# Patient Record
Sex: FEMALE | Race: WHITE | Employment: OTHER | URBAN - METROPOLITAN AREA
[De-identification: names, ages, dates, MRNs, and addresses within clinical notes are randomized per-mention and may not be internally consistent; named-entity substitution may affect disease eponyms.]

---

## 2017-02-28 ENCOUNTER — HOSPITAL ENCOUNTER (OUTPATIENT)
Dept: LAB | Age: 72
Discharge: HOME | End: 2017-02-28
Payer: MEDICARE

## 2017-02-28 ENCOUNTER — OFFICE VISIT (OUTPATIENT)
Dept: FAMILY MEDICINE CLINIC | Age: 72
End: 2017-02-28

## 2017-02-28 VITALS
SYSTOLIC BLOOD PRESSURE: 134 MMHG | DIASTOLIC BLOOD PRESSURE: 74 MMHG | WEIGHT: 123 LBS | HEIGHT: 62 IN | TEMPERATURE: 98.1 F | RESPIRATION RATE: 18 BRPM | BODY MASS INDEX: 22.63 KG/M2 | OXYGEN SATURATION: 94 % | HEART RATE: 80 BPM

## 2017-02-28 DIAGNOSIS — R30.0 DYSURIA: Primary | ICD-10-CM

## 2017-02-28 DIAGNOSIS — N39.0 URINARY TRACT INFECTION WITHOUT HEMATURIA, SITE UNSPECIFIED: ICD-10-CM

## 2017-02-28 LAB
BILIRUB UR QL STRIP: NEGATIVE
GLUCOSE UR-MCNC: NEGATIVE MG/DL
KETONES P FAST UR STRIP-MCNC: NORMAL MG/DL
PH UR STRIP: 6 [PH] (ref 4.6–8)
PROT UR QL STRIP: NEGATIVE MG/DL
SP GR UR STRIP: 1.01 (ref 1–1.03)
UA UROBILINOGEN AMB POC: NORMAL (ref 0.2–1)
URINALYSIS CLARITY POC: CLEAR
URINALYSIS COLOR POC: YELLOW
URINE BLOOD POC: NEGATIVE
URINE LEUKOCYTES POC: NORMAL
URINE NITRITES POC: NEGATIVE

## 2017-02-28 PROCEDURE — 80048 BASIC METABOLIC PNL TOTAL CA: CPT

## 2017-02-28 PROCEDURE — 87086 URINE CULTURE/COLONY COUNT: CPT

## 2017-02-28 PROCEDURE — 36415 COLL VENOUS BLD VENIPUNCTURE: CPT

## 2017-02-28 RX ORDER — NITROFURANTOIN 25; 75 MG/1; MG/1
100 CAPSULE ORAL 2 TIMES DAILY
Qty: 14 CAP | Refills: 0 | Status: SHIPPED | OUTPATIENT
Start: 2017-02-28 | End: 2017-05-16 | Stop reason: ALTCHOICE

## 2017-02-28 NOTE — PROGRESS NOTES
Chief Complaint   Patient presents with    Bladder Infection     X 7 days, frequency, urgency     1. Have you been to the ER, urgent care clinic since your last visit? Hospitalized since your last visit? No    2. Have you seen or consulted any other health care providers outside of the [address] [place] [address] since your last visit? Include any pap smears or colon screening.  No

## 2017-02-28 NOTE — MR AVS SNAPSHOT
Visit Information Date & Time Provider Department Dept. Phone Encounter #  
 2/28/2017  9:00 AM Vaughn Herrera, [address] [facility] [phone] [id number] Upcoming Health Maintenance Date Due Hepatitis C Screening 1945 FOBT Q 1 YEAR AGE 50-75 1/9/1995 GLAUCOMA SCREENING Q2Y 1/9/2010 Pneumococcal 65+ High/Highest Risk (1 of 2 - PCV13) 1/9/2010 INFLUENZA AGE 9 TO ADULT 8/1/2016 MEDICARE YEARLY EXAM 5/4/2017 BREAST CANCER SCRN MAMMOGRAM 5/16/2018 DTaP/Tdap/Td series (2 - Td) 1/27/2021 Allergies as of 2/28/2017  Review Complete On: 2/28/2017 By: Vaughn Herrera MD  
  
 Severity Noted Reaction Type Reactions Sulfa (Sulfonamide Antibiotics)  09/09/2010    Not Reported This Time Current Immunizations  Reviewed on 4/29/2014 Name Date Poliovirus vaccine 1/27/2011 Tdap 1/27/2011 Typhoid Vi Capsular Polysaccharide Vaccine 1/27/2011 Not reviewed this visit You Were Diagnosed With   
  
 Codes Comments Dysuria    -  Primary ICD-10-CM: R30.0 ICD-9-CM: 788.1 Urinary tract infection without hematuria, site unspecified     ICD-10-CM: N39.0 ICD-9-CM: 599.0 Vitals BP  
  
  
  
  
  
 134/74 (BP 1 Location: Right arm, BP Patient Position: Sitting) BMI and BSA Data Body Mass Index Body Surface Area  
 22.86 kg/m 2 1.56 m 2 Preferred Pharmacy Pharmacy Name Phone [address] [phone] [facility] [phone] Your Updated Medication List  
  
   
This list is accurate as of: 2/28/17  4:37 PM.  Always use your most recent med list.  
  
  
  
  
 albuterol 90 mcg/actuation inhaler Commonly known as:  PROVENTIL HFA, VENTOLIN HFA, PROAIR HFA Take 1 Puff by inhalation every six (6) hours as needed for Wheezing. amLODIPine 10 mg tablet Commonly known as:  [place] Mend Take 1 Tab by mouth daily. ANTACID CALCIUM PO Take  by mouth. aspirin 81 mg chewable tablet Take 325 mg by mouth daily. levothyroxine 100 mcg tablet Commonly known as:  SYNTHROID  
TAKE 1 TAB BY MOUTH DAILY (BEFORE BREAKFAST). loratadine 10 mg tablet Commonly known as:  [place] Anjali Take 1 Tab by mouth daily. M-VIT PO Take  by mouth.  
  
 mometasone 50 mcg/actuation nasal spray Commonly known as:  NASONEX  
2 Sprays by Both Nostrils route daily. nitrofurantoin (macrocrystal-monohydrate) 100 mg capsule Commonly known as:  MACROBID Take 1 Cap by mouth two (2) times a day. VITAMIN D3 2,000 unit Tab Generic drug:  cholecalciferol (vitamin D3) Take 1,000 Int'l Units by mouth. Prescriptions Sent to Pharmacy Refills  
 nitrofurantoin, macrocrystal-monohydrate, (MACROBID) 100 mg capsule 0 Sig: Take 1 Cap by mouth two (2) times a day. Class: Normal  
 Pharmacy: [address] [place] [address] Ph #: [phone] Route: Oral  
  
We Performed the Following AMB POC URINALYSIS DIP STICK AUTO W/O MICRO [31674 CPT(R)] CULTURE, URINE [id number] CPT(R)] METABOLIC PANEL, BASIC [30306 CPT(R)] Introducing [facility] SERVICES! Dear Maykel Greene: Thank you for requesting a Intellecap account. Our records indicate that you already have an active Intelle[employer] account. You can access your account anytime at [email or web address]. [email or web address]/[employer] Services Did you know that you can access your hospital and ER discharge instructions at any time in Intellecap? You can also review all of your test results from your hospital stay or ER visit. Additional Information If you have questions, please visit the Frequently Asked Questions section of the Intellecap website at [email or web address] Remember, Intellecap is NOT to be used for urgent needs. For medical emergencies, dial 911. Now available from your iPhone and Android! Please provide this summary of care documentation to your next provider. Your primary care clinician is listed as [facility], . If you have any questions after today's visit, please call [phone].

## 2017-02-28 NOTE — PROGRESS NOTES
Presents complaining of dysuria    States that she has had sxs for about one week    Drinking lots of cranberry juice yesterday    sxs for one week    Temperature has been low    + chills    No vomiting or diarrhea    Complaining of heavines in lower abdomen    Even though she feels like she has to go, she has only a dribble    Denies any back pain or burning when she urinates    Denies vaginal discharge or bleeding    Denies any flank pain    PE:  General -- awake, alert, cooperative  Back -- no tenderness with percussion  Abd -- no tenderness on palpation    U/A:  + leukocytes    Assessment:  UTI    Plan:  rx macrobid 100 mg BID  Urine culture  Check BMP for creatinine clearance

## 2017-03-01 LAB
BACTERIA UR CULT: NO GROWTH
BUN SERPL-MCNC: 10 MG/DL (ref 8–27)
BUN/CREAT SERPL: 19 (ref 11–26)
CALCIUM SERPL-MCNC: 9.5 MG/DL (ref 8.7–10.3)
CHLORIDE SERPL-SCNC: 91 MMOL/L (ref 96–106)
CO2 SERPL-SCNC: 27 MMOL/L (ref 18–29)
CREAT SERPL-MCNC: 0.54 MG/DL (ref 0.57–1)
GLUCOSE SERPL-MCNC: 88 MG/DL (ref 65–99)
POTASSIUM SERPL-SCNC: 4.9 MMOL/L (ref 3.5–5.2)
SODIUM SERPL-SCNC: 135 MMOL/L (ref 134–144)

## 2017-05-16 ENCOUNTER — HOSPITAL ENCOUNTER (OUTPATIENT)
Dept: LAB | Age: 72
Discharge: HOME | End: 2017-05-16
Payer: MEDICARE

## 2017-05-16 ENCOUNTER — OFFICE VISIT (OUTPATIENT)
Dept: FAMILY MEDICINE CLINIC | Age: 72
End: 2017-05-16

## 2017-05-16 VITALS
OXYGEN SATURATION: 94 % | HEIGHT: 62 IN | DIASTOLIC BLOOD PRESSURE: 75 MMHG | SYSTOLIC BLOOD PRESSURE: 122 MMHG | BODY MASS INDEX: 22.08 KG/M2 | RESPIRATION RATE: 16 BRPM | HEART RATE: 77 BPM | WEIGHT: 120 LBS | TEMPERATURE: 98.1 F

## 2017-05-16 DIAGNOSIS — L98.9 SKIN LESION OF FACE: ICD-10-CM

## 2017-05-16 DIAGNOSIS — Z12.31 ENCOUNTER FOR SCREENING MAMMOGRAM FOR BREAST CANCER: ICD-10-CM

## 2017-05-16 DIAGNOSIS — R63.4 UNINTENTIONAL WEIGHT LOSS: ICD-10-CM

## 2017-05-16 DIAGNOSIS — M81.0 OSTEOPOROSIS, UNSPECIFIED OSTEOPOROSIS TYPE, UNSPECIFIED PATHOLOGICAL FRACTURE PRESENCE: ICD-10-CM

## 2017-05-16 DIAGNOSIS — I10 ESSENTIAL HYPERTENSION: ICD-10-CM

## 2017-05-16 DIAGNOSIS — R05.3 CHRONIC COUGH: ICD-10-CM

## 2017-05-16 DIAGNOSIS — Z00.00 MEDICARE ANNUAL WELLNESS VISIT, SUBSEQUENT: Primary | ICD-10-CM

## 2017-05-16 DIAGNOSIS — J45.909 UNCOMPLICATED ASTHMA, UNSPECIFIED ASTHMA SEVERITY: ICD-10-CM

## 2017-05-16 DIAGNOSIS — E03.9 HYPOTHYROIDISM, UNSPECIFIED TYPE: ICD-10-CM

## 2017-05-16 PROCEDURE — 80061 LIPID PANEL: CPT

## 2017-05-16 PROCEDURE — 84443 ASSAY THYROID STIM HORMONE: CPT

## 2017-05-16 PROCEDURE — 85027 COMPLETE CBC AUTOMATED: CPT

## 2017-05-16 PROCEDURE — 36415 COLL VENOUS BLD VENIPUNCTURE: CPT

## 2017-05-16 PROCEDURE — 80053 COMPREHEN METABOLIC PANEL: CPT

## 2017-05-16 RX ORDER — LEVOCETIRIZINE DIHYDROCHLORIDE 5 MG/1
TABLET, FILM COATED ORAL
COMMUNITY

## 2017-05-16 RX ORDER — LEVOTHYROXINE SODIUM 100 UG/1
TABLET ORAL
Qty: 90 TAB | Refills: 3 | Status: SHIPPED | OUTPATIENT
Start: 2017-05-16 | End: 2018-04-30 | Stop reason: SDUPTHER

## 2017-05-16 RX ORDER — CHOLECALCIFEROL (VITAMIN D3) 125 MCG
CAPSULE ORAL
COMMUNITY

## 2017-05-16 RX ORDER — AMLODIPINE BESYLATE 10 MG/1
10 TABLET ORAL DAILY
Qty: 90 TAB | Refills: 3 | Status: SHIPPED | OUTPATIENT
Start: 2017-05-16 | End: 2018-05-21 | Stop reason: SDUPTHER

## 2017-05-16 RX ORDER — ALBUTEROL SULFATE 90 UG/1
1 AEROSOL, METERED RESPIRATORY (INHALATION)
Qty: 3 INHALER | Refills: 3 | Status: SHIPPED | OUTPATIENT
Start: 2017-05-16 | End: 2019-01-30 | Stop reason: SDUPTHER

## 2017-05-16 NOTE — PROGRESS NOTES
Date of visit: 5/16/2017       This is an Subsequent Medicare Annual Wellness Visit (AWV). I have reviewed the patient's medical history in detail and updated the computerized patient record. Shila Faulkner is a 67 y.o. female   History obtained from: the patient. Concerns today   Was on zyrtec -- now taking xytal     States nasonex not working  States will try flonase if she needs nasal steroid    States that some days her back aches -- paraspinal more in the thoracic region from coughing    + cough -- light sputum, white at times  Feels that it is due to drainage down her throat  Remote hx of smoking -- also states unintentional weight loss  Has not had any recent imaging  Hx asthma -- asking if she can get albuterol solution and a nebulizer    States has been through immunotherapy in the past and not interested in doing that now    Also has scaly lesions that she is concerned about on her face -- will refer to dermatology    History     Patient Active Problem List   Diagnosis Code    HTN (hypertension) I10    Hypercholesterolemia E78.00    Hypothyroid E03.9    Asthma J45.909    Osteoporosis M81.0    Allergic rhinitis due to allergen J30.9     Past Medical History:   Diagnosis Date    Asthma     Hypertension       Past Surgical History:   Procedure Laterality Date    HX DILATION AND CURETTAGE       Allergies   Allergen Reactions    Sulfa (Sulfonamide Antibiotics) Not Reported This Time     Current Outpatient Prescriptions   Medication Sig Dispense Refill    levocetirizine (XYZAL) 5 mg tablet Take  by mouth.  melatonin tab tablet Take  by mouth nightly.  albuterol (PROVENTIL HFA, VENTOLIN HFA, PROAIR HFA) 90 mcg/actuation inhaler Take 1 Puff by inhalation every six (6) hours as needed for Wheezing. 3 Inhaler 3    amLODIPine (NORVASC) 10 mg tablet Take 1 Tab by mouth daily. 90 Tab 3    levothyroxine (SYNTHROID) 100 mcg tablet TAKE 1 TAB BY MOUTH DAILY (BEFORE BREAKFAST). 90 Tab 3    CALCIUM CARBONATE (ANTACID CALCIUM PO) Take  by mouth.  cholecalciferol, vitamin D3, (VITAMIN D3) 2,000 unit Tab Take 1,000 Int'l Units by mouth.  aspirin 81 mg chewable tablet Take 325 mg by mouth daily.  PV W-O BONY/FERROUS FUMARATE/FA (M-VIT PO) Take  by mouth.  mometasone (NASONEX) 50 mcg/actuation nasal spray 2 Sprays by Both Nostrils route daily. 3 Container 3    loratadine (CLARITIN) 10 mg tablet Take 1 Tab by mouth daily. 61 Tab 1     Family History   Problem Relation Age of Onset    Heart Disease Mother      Social History   Substance Use Topics    Smoking status: Former Smoker     Quit date: 1/1/1999    Smokeless tobacco: Never Used    Alcohol use No       Specialists/Care Team   Jose Dunaway has established care with the following healthcare providers:  Patient Care Team:  Henry Rubin MD as PCP - [address]     Demographics   female  67 y.o. General Health Questions   -During the past 4 weeks:   -how would you rate your health in general? Fair   -how often have you been bothered by feeling dizzy when standing up? occasionally   -how much have you been bothered by bodily pain? mildly   -Have you noticed any hearing difficulties? no   -has your physical and emotional health limited your social activities with family or friends? yes due to coughing    Emotional Health Questions   -Do you have a history of depression, anxiety, or emotional problems? no  -Over the past 2 weeks, have you felt down, depressed or hopeless? no  -Over the past 2 weeks, have you felt little interest or pleasure in doing things? no    Health Habits   Please describe your diet habits: healthy; fruit and vegetable, whole grains, protein, not much dairy  Do you get 5 servings of fruits or vegetables daily? no  Do you exercise regularly?  No, light yard work    Activities of Daily Living and Functional Status   -Do you need help with eating, walking, dressing, bathing, toileting, the phone, transportation, shopping, preparing meals, housework, laundry, medications or managing money? no  -In the past four weeks, was someone available to help you if you needed and wanted help with anything? yes  -Are you confident are you that you can control and manage most of your health problems? yes  -Have you been given information to help you keep track of your medications? yes  -How often do you have trouble taking your medications as prescribed? occasionally    Fall Risk and Home Safety   Have you fallen 2 or more times in the past year? no  Does your home have rugs in the hallway, lack grab bars in the bathroom, lack handrails on the stairs or have poor lighting? no  Do you have smoke detectors and check them regularly? yes  Do you have difficulties driving a car? no  Do you always fasten your seat belt when you are in a car? yes    Review of Systems (if indicated for problems addressed today)   + cough  Weight loss    Physical Examination     Vitals:    05/16/17 0901   BP: 122/75   Pulse: 77   Resp: 16   Temp: 98.1 °F (36.7 °C)   TempSrc: Oral   SpO2: 94%   Weight: 120 lb (54.4 kg)   Height: 5' 1.5\" (1.562 m)     Body mass index is 22.31 kg/(m^2).    General -- awake, alert  Lungs -- clear bilaterally  CV -- regular, S1S2  abd -- soft, NT, ND  Ext -- no edema, no tenderness    Was the patient's timed Up & Go test unsteady or longer than 30 seconds? no    Evaluation of Cognitive Function   Mood/affect:  neutral  Orientation: Person, Place, Time and Situation  Appearance: casually dressed    Advice/Referrals/Counseling (as indicated)   Education and counseling provided for any problems identified above:   Referral for colonoscopy; also given stool DNA card  Osteoporosis hx -- needs med but wants to wait at this time   Chronic cough -- will obtain CT scan chest    Preventive Services     (Preventive care checklist to be included in patient instructions)  Discussed today Done Previously Not Needed     X states has had both  Pneumococcal vaccines    x  Flu vaccine    2009  Hepatitis B vaccine (if at risk)    x  Shingles vaccine    2011  TDAP vaccine    2016  Mammogram     x Pap smear    X due now for repeat  Colorectal cancer screening   X discussed today and ordered   Low-dose CT for lung cancer screening    X  Bone density test      Glaucoma screening      Cholesterol test      Diabetes screening test       Diabetes self-management class      Nutritionist referral for diabetes or renal disease     Discussion of Advance Directive   Discussed with Britt Smith. [place] her ability to prepare and advance directive in the case that an injury or illness causes her to be unable to make health care decisions.    Has living will    Assessment/Plan   Z00.00  Hypothyroidism  HTN  Osteoporosis  Chronic cough  Hx tobacco use  Allergic rhinitis      Orders Placed This Encounter    levocetirizine (XYZAL) 5 mg tablet    melatonin tab tablet           Akilah Hooper MD

## 2017-05-16 NOTE — MR AVS SNAPSHOT
Visit Information Date & Time Provider Department Dept. Phone Encounter #  
 5/16/2017  9:20 AM Perla Mail, [address] [facility] [phone] [id number] Upcoming Health Maintenance Date Due Hepatitis C Screening 1945 FOBT Q 1 YEAR AGE 50-75 1/9/1995 GLAUCOMA SCREENING Q2Y 1/9/2010 Pneumococcal 65+ High/Highest Risk (1 of 2 - PCV13) 1/9/2010 MEDICARE YEARLY EXAM 5/4/2017 INFLUENZA AGE 9 TO ADULT 8/1/2017 BREAST CANCER SCRN MAMMOGRAM 5/16/2018 DTaP/Tdap/Td series (2 - Td) 1/27/2021 Allergies as of 5/16/2017  Review Complete On: 5/16/2017 By: Deepti Barrera LPN Severity Noted Reaction Type Reactions Sulfa (Sulfonamide Antibiotics)  09/09/2010    Not Reported This Time Current Immunizations  Reviewed on 4/29/2014 Name Date Poliovirus vaccine 1/27/2011 Tdap 1/27/2011 Typhoid Vi Capsular Polysaccharide Vaccine 1/27/2011 Not reviewed this visit You Were Diagnosed With   
  
 Codes Comments Medicare annual wellness visit, subsequent    -  Primary ICD-10-CM: Z00.00 ICD-9-CM: V70.0 Chronic cough     ICD-10-CM: R05 ICD-9-CM: 786.2 Osteoporosis, unspecified osteoporosis type, unspecified pathological fracture presence     ICD-10-CM: M81.0 ICD-9-CM: 733.00 Uncomplicated asthma, unspecified asthma severity     ICD-10-CM: J45.909 ICD-9-CM: 493.90 Essential hypertension     ICD-10-CM: I10 
ICD-9-CM: 401.9 Skin lesion of face     ICD-10-CM: L98.9 ICD-9-CM: 709.9 Unintentional weight loss     ICD-10-CM: R63.4 ICD-9-CM: 783.21 Hypothyroidism, unspecified type     ICD-10-CM: E03.9 ICD-9-CM: 244.9 Encounter for screening mammogram for breast cancer     ICD-10-CM: Z12.31 
ICD-9-CM: V76.12 Vitals BP Pulse Temp Resp Height(growth percentile) Weight(growth percentile)  122/75 (BP 1 Location: Left arm, BP Patient Position: Sitting) 77 98.1 °F (36.7 °C) (Oral) 16 5' 1.5\" (1.562 m) 120 lb (54.4 kg) LMP SpO2 BMI OB Status Smoking Status 06/20/2000 94% 22.31 kg/m2 Postmenopausal Former Smoker Vitals History BMI and BSA Data Body Mass Index Body Surface Area  
 22.31 kg/m 2 1.54 m 2 Preferred Pharmacy Pharmacy Name Phone [address] [id number] [place], Susan. \Hospital [facility] a New Name and Outlook.\"" [phone] Your Updated Medication List  
  
   
This list is accurate as of: 5/16/17 10:19 AM.  Always use your most recent med list.  
  
  
  
  
 albuterol 90 mcg/actuation inhaler Commonly known as:  PROVENTIL HFA, VENTOLIN HFA, PROAIR HFA Take 1 Puff by inhalation every six (6) hours as needed for Wheezing. amLODIPine 10 mg tablet Commonly known as:  [place] Dayron Take 1 Tab by mouth daily. ANTACID CALCIUM PO Take  by mouth. aspirin 81 mg chewable tablet Take 325 mg by mouth daily. levothyroxine 100 mcg tablet Commonly known as:  SYNTHROID  
TAKE 1 TAB BY MOUTH DAILY (BEFORE BREAKFAST). loratadine 10 mg tablet Commonly known as:  [place] Cowboy Take 1 Tab by mouth daily. M-VIT PO Take  by mouth.  
  
 melatonin Tab tablet Take  by mouth nightly. mometasone 50 mcg/actuation nasal spray Commonly known as:  NASONEX  
2 Sprays by Both Nostrils route daily. VITAMIN D3 2,000 unit Tab Generic drug:  cholecalciferol (vitamin D3) Take 1,000 Int'l Units by mouth. XYZAL 5 mg tablet Generic drug:  levocetirizine Take  by mouth. Prescriptions Sent to Pharmacy Refills  
 albuterol (PROVENTIL HFA, VENTOLIN HFA, PROAIR HFA) 90 mcg/actuation inhaler 3 Sig: Take 1 Puff by inhalation every six (6) hours as needed for Wheezing. Class: Normal  
 Pharmacy: [id number] - [address] [place] [address] Ph #: [phone]  Route: Inhalation  
 levothyroxine (SYNTHROID) 100 mcg tablet 3  
 Sig: TAKE 1 TAB BY MOUTH DAILY (BEFORE BREAKFAST). Class: Normal  
 Pharmacy: [facility] [address], [address] Ph #: [phone]  
 amLODIPine (NORVASC) 10 mg tablet 3 Sig: Take 1 Tab by mouth daily. Class: Normal  
 Pharmacy: [id number]  [address] [place], [place] - [address] [facility] Ph #: [phone] Route: Oral  
  
We Performed the Following CBC W/O DIFF [12072 CPT(R)] LIPID PANEL [09464 CPT(R)] METABOLIC PANEL, COMPREHENSIVE [92314 CPT(R)] REFERRAL TO DERMATOLOGY [REF19 Custom] Comments:  
 Please evaluate patient for scaly, facial skin lesion TSH 3RD GENERATION [98280 CPT(R)] To-Do List   
 05/16/2017 Imaging:  CT CHEST W WO CONT   
  
 05/16/2017 Imaging:  HARPAL 3D HARMEET W MAMMO BI SCREENING INCL CAD Referral Information Referral ID Referred By Referred To  
  
 [id number] Mariel Yu MD   
   [address] [place] [address] Phone: [phone] Fax: [phone] Visits Status Start Date End Date 1 New Request 5/16/17 5/16/18 If your referral has a status of pending review or denied, additional information will be sent to support the outcome of this decision. Introducing [facility] SERVICES! Dear Vandana Harmon: Thank you for requesting a EV[employer] account. Our records indicate that you already have an active EV[employer] account. You can access your account anytime at [email or web address] Did you know that you can access your hospital and ER discharge instructions at any time in EV[employer]? You can also review all of your test results from your hospital stay or ER visit. Additional Information If you have questions, please visit the Frequently Asked Questions section of the [employer] website at [email or web address] Remember, [employer] is NOT to be used for urgent needs. For medical emergencies, dial 911. Now available from your iPhone and Android! Please provide this summary of care documentation to your next provider. Your primary care clinician is listed as [address] [place] [address], . If you have any questions after today's visit, please call [phone].

## 2017-05-16 NOTE — PROGRESS NOTES
Chief Complaint   Patient presents with   [place] Annual Wellness Visit     1. Have you been to the ER, urgent care clinic since your last visit? Hospitalized since your last visit? No    2. Have you seen or consulted any other health care providers outside of the [address] [place] [address] since your last visit? Include any pap smears or colon screening.  No

## 2017-05-16 NOTE — LETTER
5/19/2017 1:45 PM 
 
Ms. Tonio Mejia [address] [place] [address] Dear Tonio Mejia: 
 
Please find your most recent results below. Resulted Orders LIPID PANEL Result Value Ref Range Cholesterol, total 264 (H) 100 - 199 mg/dL Triglyceride 121 0 - 149 mg/dL HDL Cholesterol 89 >39 mg/dL VLDL, calculated 24 5 - 40 mg/dL LDL, calculated 151 (H) 0 - 99 mg/dL Narrative Performed at:  [address]  [id number] : Billy Martins MD, Phone:  [phone] METABOLIC PANEL, COMPREHENSIVE Result Value Ref Range Glucose 86 65 - 99 mg/dL BUN 7 (L) 8 - 27 mg/dL Creatinine 0.61 0.57 - 1.00 mg/dL GFR est non-AA 91 >59 mL/min/1.73 GFR est  >59 mL/min/1.73  
 BUN/Creatinine ratio 11 (L) 12 - 28 Sodium 137 134 - 144 mmol/L Potassium 4.6 3.5 - 5.2 mmol/L Chloride 95 (L) 96 - 106 mmol/L  
 CO2 29 18 - 29 mmol/L Calcium 9.6 8.7 - 10.3 mg/dL Protein, total 8.2 6.0 - 8.5 g/dL Albumin 4.4 3.5 - 4.8 g/dL GLOBULIN, TOTAL 3.8 1.5 - 4.5 g/dL A-G Ratio 1.2 1.2 - 2.2 Bilirubin, total 0.3 0.0 - 1.2 mg/dL Alk. phosphatase 72 39 - 117 IU/L  
 AST (SGOT) 26 0 - 40 IU/L  
 ALT (SGPT) 15 0 - 32 IU/L Narrative Performed at:  [address]  [id number] : Billy Martins MD, Phone:  [phone] CBC W/O DIFF Result Value Ref Range WBC 10.0 3.4 - 10.8 x10E3/uL  
 RBC 4.66 3.77 - 5.28 x10E6/uL HGB 14.1 11.1 - 15.9 g/dL HCT 42.9 34.0 - 46.6 % MCV 92 79 - 97 fL  
 MCH 30.3 26.6 - 33.0 pg  
 MCHC 32.9 31.5 - 35.7 g/dL  
 RDW 13.5 12.3 - 15.4 % PLATELET 166 349 - 775 x10E3/uL Narrative Performed at:  [address]  [id number] : Billy Martins MD, Phone:  [phone] TSH 3RD GENERATION Result Value Ref Range TSH 3.100 0.450 - 4.500 uIU/mL Narrative Performed at:  [address]  [id number] : Stef Cordoba MD, Phone:  [phone] CVD REPORT Result Value Ref Range INTERPRETATION Note Comment:  
   Supplement report is available. Narrative Performed at:  [address] [place] [address]  [id number] : Renetta Pardo PhD, Phone:  [phone] Results    
    
  Cholesterol level higher -- would recommend statin Normal glucose Normal kidney function Normal liver function No anemia Normal thyroid function Please call me if you have any questions: [phone] Sincerely, Durga Orozco MD

## 2017-05-17 PROBLEM — R05.3 CHRONIC COUGH: Status: ACTIVE | Noted: 2017-05-17

## 2017-05-17 PROBLEM — R63.4 UNINTENTIONAL WEIGHT LOSS: Status: ACTIVE | Noted: 2017-05-17

## 2017-05-17 PROBLEM — Z87.891 FORMER SMOKER: Status: ACTIVE | Noted: 2017-05-17

## 2017-05-17 LAB
ALBUMIN SERPL-MCNC: 4.4 G/DL (ref 3.5–4.8)
ALBUMIN/GLOB SERPL: 1.2 {RATIO} (ref 1.2–2.2)
ALP SERPL-CCNC: 72 IU/L (ref 39–117)
ALT SERPL-CCNC: 15 IU/L (ref 0–32)
AST SERPL-CCNC: 26 IU/L (ref 0–40)
BILIRUB SERPL-MCNC: 0.3 MG/DL (ref 0–1.2)
BUN SERPL-MCNC: 7 MG/DL (ref 8–27)
BUN/CREAT SERPL: 11 (ref 12–28)
CALCIUM SERPL-MCNC: 9.6 MG/DL (ref 8.7–10.3)
CHLORIDE SERPL-SCNC: 95 MMOL/L (ref 96–106)
CHOLEST SERPL-MCNC: 264 MG/DL (ref 100–199)
CO2 SERPL-SCNC: 29 MMOL/L (ref 18–29)
CREAT SERPL-MCNC: 0.61 MG/DL (ref 0.57–1)
ERYTHROCYTE [DISTWIDTH] IN BLOOD BY AUTOMATED COUNT: 13.5 % (ref 12.3–15.4)
GLOBULIN SER CALC-MCNC: 3.8 G/DL (ref 1.5–4.5)
GLUCOSE SERPL-MCNC: 86 MG/DL (ref 65–99)
HCT VFR BLD AUTO: 42.9 % (ref 34–46.6)
HDLC SERPL-MCNC: 89 MG/DL
HGB BLD-MCNC: 14.1 G/DL (ref 11.1–15.9)
INTERPRETATION, 910389: NORMAL
LDLC SERPL CALC-MCNC: 151 MG/DL (ref 0–99)
MCH RBC QN AUTO: 30.3 PG (ref 26.6–33)
MCHC RBC AUTO-ENTMCNC: 32.9 G/DL (ref 31.5–35.7)
MCV RBC AUTO: 92 FL (ref 79–97)
PLATELET # BLD AUTO: 376 X10E3/UL (ref 150–379)
POTASSIUM SERPL-SCNC: 4.6 MMOL/L (ref 3.5–5.2)
PROT SERPL-MCNC: 8.2 G/DL (ref 6–8.5)
RBC # BLD AUTO: 4.66 X10E6/UL (ref 3.77–5.28)
SODIUM SERPL-SCNC: 137 MMOL/L (ref 134–144)
TRIGL SERPL-MCNC: 121 MG/DL (ref 0–149)
TSH SERPL DL<=0.005 MIU/L-ACNC: 3.1 UIU/ML (ref 0.45–4.5)
VLDLC SERPL CALC-MCNC: 24 MG/DL (ref 5–40)
WBC # BLD AUTO: 10 X10E3/UL (ref 3.4–10.8)

## 2017-05-18 NOTE — PROGRESS NOTES
Cholesterol level higher -- would recommend statin  Normal glucose  Normal kidney function  Normal liver function  No anemia  Normal thyroid function

## 2017-05-19 ENCOUNTER — HOSPITAL ENCOUNTER (OUTPATIENT)
Dept: LAB | Age: 72
Discharge: HOME | End: 2017-05-19
Payer: MEDICARE

## 2017-05-19 PROCEDURE — 82270 OCCULT BLOOD FECES: CPT

## 2017-05-26 ENCOUNTER — HOSPITAL ENCOUNTER (OUTPATIENT)
Dept: MAMMOGRAPHY | Age: 72
Discharge: HOME | End: 2017-05-26
Attending: FAMILY MEDICINE
Payer: MEDICARE

## 2017-05-26 ENCOUNTER — HOSPITAL ENCOUNTER (OUTPATIENT)
Dept: CT IMAGING | Age: 72
Discharge: HOME | End: 2017-05-26
Attending: FAMILY MEDICINE
Payer: MEDICARE

## 2017-05-26 DIAGNOSIS — R63.4 UNINTENTIONAL WEIGHT LOSS: ICD-10-CM

## 2017-05-26 DIAGNOSIS — Z12.31 ENCOUNTER FOR SCREENING MAMMOGRAM FOR BREAST CANCER: ICD-10-CM

## 2017-05-26 DIAGNOSIS — R05.3 CHRONIC COUGH: ICD-10-CM

## 2017-05-26 PROCEDURE — 77063 BREAST TOMOSYNTHESIS BI: CPT

## 2017-05-26 PROCEDURE — 74011636320 HC RX REV CODE- 636/320: Performed by: RADIOLOGY

## 2017-05-26 PROCEDURE — 71260 CT THORAX DX C+: CPT

## 2017-05-26 RX ADMIN — IOPAMIDOL 100 ML: 612 INJECTION, SOLUTION INTRAVENOUS at 11:44

## 2017-05-26 NOTE — LETTER
5/30/2017 2:03 PM 
 
Ms. Karthik Richards [address] [place] [address] Dear Karthik Richards: 
 
Please find your most recent results below. Resulted Orders CT CHEST W CONT Narrative Indication: Chronic cough, unintentional weight loss, history of hypertension 
and asthma. COMPARISON: None Multiple axial images were obtained from the lung apices to the adrenal glands 
following the uneventful administration of 95 mL of Isovue 300. Images were 
reformatted in the sagittal and coronal plane. CT dose reduction was achieved 
through use of a standardized protocol tailored for this examination and 
automatic exposure control for dose modulation. The visualized portions of the thyroid gland is normally. There is no axillary 
adenopathy. Mediastinal windows demonstrate mildly enlarged lymph nodes. In the pretracheal 
region there is a 1.2 x 1.1 cm lymph node. In the precarinal region is a 1.8 x 
1.1 cm lymph node in the subcarinal location there is a 2.0 x 0.8 cm lymph node. There is a mildly enlarged lymph node in the medial AP window and prominent 
lymph nodes and lateral AP window. Etiology of this adenopathy is uncertain 
clinical correlation close follow-up is suggested. There is prominent adipose tissue in the intraatrial septum consistent with 
lipomatous infiltration of the intra-atrial septum. No pleural or pericardial effusions. Lung windows demonstrate fairly severe changes of centrilobular and paraseptal 
emphysema there are peripheral bulla and blebs in the upper lobes right greater 
than left. There are numerous reticular opacities in the periphery of the lungs 
most pronounced in the lower lobes there does appear to be some honeycombing in 
the lower lobes. This is not well evaluated on these nonhigh-resolution images. This could be due to pulmonary fibrosis. High-resolution CT scan may yield more 
information if clinically warranted. No focal airspace process to suggest pneumonia. The central airways are patent. Review of bone windows reveals no destructive lesions. Imaging through the upper abdomen reveals no adrenal lesions. Impression IMPRESSION: 
1. Fairly severe changes of emphysema, both centrilobular and paraseptal. 
2. Peripheral reticular opacities and areas of honeycombing at the lung bases 
suggest pulmonary fibrosis. Clinical correlation is suggested. High-resolution 
images may yield more information. 3. Adenopathy as described. This could be reactive. Exact etiology is uncertain 
follow-up exam in 3 months may yield more information. RECOMMENDATIONS: 
 
Chest CT scan show changes consistent with emphysema Also signs of enlarged lymph nodes Recommend referral to Pulmonary -- will place referral  
In addition to albuterol inhaler/nebulizer treatments. Advair was also added.    
   
 
 
Please call me if you have any questions: [phone] Sincerely, [facility] [place] 1

## 2017-05-26 NOTE — LETTER
5/30/2017 2:02 PM 
 
Ms. Spring Nina [address] [place] [address] Dear Spring Nina: 
 
Please find your most recent results below. Resulted Orders CT CHEST W CONT Narrative Indication: Chronic cough, unintentional weight loss, history of hypertension 
and asthma. COMPARISON: None Multiple axial images were obtained from the lung apices to the adrenal glands 
following the uneventful administration of 95 mL of Isovue 300. Images were 
reformatted in the sagittal and coronal plane. CT dose reduction was achieved 
through use of a standardized protocol tailored for this examination and 
automatic exposure control for dose modulation. The visualized portions of the thyroid gland is normally. There is no axillary 
adenopathy. Mediastinal windows demonstrate mildly enlarged lymph nodes. In the pretracheal 
region there is a 1.2 x 1.1 cm lymph node. In the precarinal region is a 1.8 x 
1.1 cm lymph node in the subcarinal location there is a 2.0 x 0.8 cm lymph node. There is a mildly enlarged lymph node in the medial AP window and prominent 
lymph nodes and lateral AP window. Etiology of this adenopathy is uncertain 
clinical correlation close follow-up is suggested. There is prominent adipose tissue in the intraatrial septum consistent with 
lipomatous infiltration of the intra-atrial septum. No pleural or pericardial effusions. Lung windows demonstrate fairly severe changes of centrilobular and paraseptal 
emphysema there are peripheral bulla and blebs in the upper lobes right greater 
than left. There are numerous reticular opacities in the periphery of the lungs 
most pronounced in the lower lobes there does appear to be some honeycombing in 
the lower lobes. This is not well evaluated on these nonhigh-resolution images. This could be due to pulmonary fibrosis. High-resolution CT scan may yield more 
information if clinically warranted. No focal airspace process to suggest pneumonia. The central airways are patent. Review of bone windows reveals no destructive lesions. Imaging through the upper abdomen reveals no adrenal lesions. Impression IMPRESSION: 
1. Fairly severe changes of emphysema, both centrilobular and paraseptal. 
2. Peripheral reticular opacities and areas of honeycombing at the lung bases 
suggest pulmonary fibrosis. Clinical correlation is suggested. High-resolution 
images may yield more information. 3. Adenopathy as described. This could be reactive. Exact etiology is uncertain 
follow-up exam in 3 months may yield more information. RECOMMENDATIONS: 
 
In addition to albuterol inhaler/nebulizer treatments, have also added advair Please call me if you have any questions: [phone] Sincerely, [facility] CT 1

## 2017-05-28 DIAGNOSIS — J44.9 CHRONIC OBSTRUCTIVE PULMONARY DISEASE, UNSPECIFIED COPD TYPE (HCC): Primary | ICD-10-CM

## 2017-05-28 RX ORDER — FLUTICASONE PROPIONATE AND SALMETEROL 250; 50 UG/1; UG/1
1 POWDER RESPIRATORY (INHALATION) EVERY 12 HOURS
Qty: 1 INHALER | Refills: 3 | Status: SHIPPED | OUTPATIENT
Start: 2017-05-28 | End: 2019-12-03 | Stop reason: SDUPTHER

## 2017-05-28 NOTE — PROGRESS NOTES
Chest CT scan show changes consistent with emphysema  Also signs of enlarged lymph nodes  Recommend referral to Pulmonary -- will place referral

## 2017-06-14 RX ORDER — ALBUTEROL SULFATE 0.83 MG/ML
SOLUTION RESPIRATORY (INHALATION)
Qty: 75 EACH | Refills: 1 | Status: SHIPPED | OUTPATIENT
Start: 2017-06-14 | End: 2018-02-10 | Stop reason: SDUPTHER

## 2017-10-17 ENCOUNTER — TELEPHONE (OUTPATIENT)
Dept: FAMILY MEDICINE CLINIC | Age: 72
End: 2017-10-17

## 2017-10-17 NOTE — TELEPHONE ENCOUNTER
Patient called the office today regarding a letter she received in the mail from us about her referral orders. Patient stated she will no longer need to go to Dr. Aden Billmary because her condition has cleared up. She would like to know if Dr. Demian Oreilly would still like her to go to Pulmonary, Dr. Omar Bravo. She states she feel better than ever and does not feel like she needs to go but will go if Dr. Demian Oreilly thinks she should. Please call patient back at [phone] or respond Via BeatDeck.

## 2017-11-03 ENCOUNTER — TELEPHONE (OUTPATIENT)
Dept: FAMILY MEDICINE CLINIC | Age: 72
End: 2017-11-03

## 2017-11-03 NOTE — TELEPHONE ENCOUNTER
Patient states that Dr. Hilario Cardona wants her to have a PA/LAT chest xray ray ordered before appt of 11/14/17 with them. Patient asking to be contacted once order placed.     thanks

## 2017-11-07 DIAGNOSIS — Z87.891 FORMER SMOKER: Primary | ICD-10-CM

## 2017-11-07 DIAGNOSIS — R05.3 CHRONIC COUGH: ICD-10-CM

## 2018-02-12 RX ORDER — ALBUTEROL SULFATE 0.83 MG/ML
SOLUTION RESPIRATORY (INHALATION)
Qty: 75 EACH | Refills: 0 | Status: SHIPPED | OUTPATIENT
Start: 2018-02-12

## 2018-05-01 NOTE — TELEPHONE ENCOUNTER
From: Tanisha Glover  To: Delaney Alarcon MD  Sent: 4/30/2018 6:20 PM EDT  Subject: Medication Renewal Request    Original authorizing provider: MD Case Arambula would like a refill of the following medications:  levothyroxine (SYNTHROID) 100 mcg tablet [Kesha Estrada MD]    Preferred pharmacy: [address] [place] [address], [address]     Comment:  I have a Wellness appt on 5-18, but I will run out of this Rx about 6 days early.

## 2018-05-03 RX ORDER — LEVOTHYROXINE SODIUM 100 UG/1
TABLET ORAL
Qty: 90 TAB | Refills: 3 | Status: SHIPPED | OUTPATIENT
Start: 2018-05-03 | End: 2019-04-28 | Stop reason: SDUPTHER

## 2018-05-18 ENCOUNTER — OFFICE VISIT (OUTPATIENT)
Dept: FAMILY MEDICINE CLINIC | Age: 73
End: 2018-05-18

## 2018-05-18 VITALS
DIASTOLIC BLOOD PRESSURE: 79 MMHG | OXYGEN SATURATION: 93 % | SYSTOLIC BLOOD PRESSURE: 135 MMHG | HEART RATE: 74 BPM | TEMPERATURE: 98.1 F | BODY MASS INDEX: 22.19 KG/M2 | RESPIRATION RATE: 16 BRPM | HEIGHT: 62 IN | WEIGHT: 120.6 LBS

## 2018-05-18 DIAGNOSIS — M81.0 OSTEOPOROSIS, UNSPECIFIED OSTEOPOROSIS TYPE, UNSPECIFIED PATHOLOGICAL FRACTURE PRESENCE: ICD-10-CM

## 2018-05-18 DIAGNOSIS — E03.9 HYPOTHYROIDISM, UNSPECIFIED TYPE: ICD-10-CM

## 2018-05-18 DIAGNOSIS — Z00.00 MEDICARE ANNUAL WELLNESS VISIT, SUBSEQUENT: Primary | ICD-10-CM

## 2018-05-18 DIAGNOSIS — I10 ESSENTIAL HYPERTENSION: ICD-10-CM

## 2018-05-18 DIAGNOSIS — Z87.891 FORMER SMOKER: ICD-10-CM

## 2018-05-18 DIAGNOSIS — E78.00 HYPERCHOLESTEROLEMIA: ICD-10-CM

## 2018-05-18 NOTE — MR AVS SNAPSHOT
[address] 
[phone] Patient: Lola Bowman MRN: [id number] Visit Information Date & Time Provider Department Dept. Phone Encounter #  
 2018  9:20 AM Janna Arteaga, Megan Moran [phone] [id number] Upcoming Health Maintenance Date Due Hepatitis C Screening 1945 FOBT Q 1 YEAR AGE 50-75 1995 GLAUCOMA SCREENING Q2Y 2010 Pneumococcal 65+ Low/Medium Risk (1 of 2 - PCV13) 2010 MEDICARE YEARLY EXAM 2018 Influenza Age 5 to Adult 2018 BREAST CANCER SCRN MAMMOGRAM 2019 DTaP/Tdap/Td series (2 - Td) 2021 Allergies as of 2018  Review Complete On: 2018 By: Nu Moore LPN Severity Noted Reaction Type Reactions Sulfa (Sulfonamide Antibiotics)  2010    Not Reported This Time Current Immunizations  Reviewed on 2014 Name Date Poliovirus vaccine 2011 Tdap 2011 Typhoid Vi Capsular Polysaccharide Vaccine 2011 Not reviewed this visit You Were Diagnosed With   
  
 Codes Comments Essential hypertension    -  Primary ICD-10-CM: I10 
ICD-9-CM: 401.9 Hypercholesterolemia     ICD-10-CM: E78.00 ICD-9-CM: 272.0 Hypothyroidism, unspecified type     ICD-10-CM: E03.9 ICD-9-CM: 244.9 Osteoporosis, unspecified osteoporosis type, unspecified pathological fracture presence     ICD-10-CM: M81.0 ICD-9-CM: 733.00 Former smoker     ICD-10-CM: Y44.794 ICD-9-CM: V15.82 Vitals BP Pulse Temp Resp Height(growth percentile) Weight(growth percentile) 135/79 (BP 1 Location: Right arm, BP Patient Position: Sitting) 74 98.1 °F (36.7 °C) (Oral) 16 5' 1.5\" (1.562 m) 120 lb 9.6 oz (54.7 kg) LMP SpO2 BMI OB Status Smoking Status 2000 93% 22.42 kg/m2 Postmenopausal Former Smoker Vitals History BMI and BSA Data Body Mass Index Body Surface Area  
 22.42 kg/m 2 1.54 m 2 Preferred Pharmacy Pharmacy Name Phone FOOD [facility] [address] [facility] [address] [facility] [id number] [facility] [phone] Your Updated Medication List  
  
   
This list is accurate as of 5/18/18  4:36 PM.  Always use your most recent med list.  
  
  
  
  
 * albuterol 90 mcg/actuation inhaler Commonly known as:  PROVENTIL HFA, VENTOLIN HFA, PROAIR HFA Take 1 Puff by inhalation every six (6) hours as needed for Wheezing. * albuterol 2.5 mg /3 mL (0.083 %) nebulizer solution Commonly known as:  PROVENTIL VENTOLIN  
use 1 vial via nebulizer once for 1 dose  
  
 amLODIPine 10 mg tablet Commonly known as:  Umair Dixie Take 1 Tab by mouth daily. ANTACID CALCIUM PO Take  by mouth. aspirin 81 mg chewable tablet Take 325 mg by mouth daily. fluticasone-salmeterol 250-50 mcg/dose diskus inhaler Commonly known as:  ADVAIR Take 1 Puff by inhalation every twelve (12) hours. levothyroxine 100 mcg tablet Commonly known as:  SYNTHROID  
TAKE 1 TAB BY MOUTH DAILY (BEFORE BREAKFAST). M-VIT PO Take  by mouth.  
  
 melatonin Tab tablet Take  by mouth nightly. NASONEX 50 mcg/actuation nasal spray Generic drug:  mometasone Nebulizer & Compressor machine 1 Each by Does Not Apply route as needed. VITAMIN D3 2,000 unit Tab Generic drug:  cholecalciferol (vitamin D3) Take 1,000 Int'l Units by mouth. XYZAL 5 mg tablet Generic drug:  levocetirizine Take  by mouth. * Notice: This list has 2 medication(s) that are the same as other medications prescribed for you. Read the directions carefully, and ask your doctor or other care provider to review them with you. We Performed the Following CBC W/O DIFF [10285 CPT(R)] LIPID PANEL [38776 CPT(R)] METABOLIC PANEL, COMPREHENSIVE [49251 CPT(R)] TSH 3RD GENERATION [14591 CPT(R)] Introducing [address]! Dear Willa Velez: Thank you for requesting a [employer] account. Our records indicate that you already have an active [employer] account. You can access your account anytime at [email or web address] Did you know that you can access your hospital and ER discharge instructions at any time in [employer]? You can also review all of your test results from your hospital stay or ER visit. Additional Information If you have questions, please visit the Frequently Asked Questions section of the Secure[employer] website at [email or web address] Remember, SecureDB is NOT to be used for urgent needs. For medical emergencies, dial 911. Now available from your iPhone and Android! Please provide this summary of care documentation to your next provider. Your primary care clinician is listed as [address] [place] [address]. If you have any questions after today's visit, please call [phone].

## 2018-05-18 NOTE — PROGRESS NOTES
Identified Patient with two Patient identifiers (Name and ). Two Patient Identifiers confirmed. Reviewed record in preparation for visit and have obtained necessary documentation. Chief Complaint   Patient presents with    Annual Wellness Visit       Vitals:    18 0901 18 1006   BP: 153/89 135/79   BP 1 Location: Right arm Right arm   BP Patient Position: Sitting Sitting   Pulse: 73 74   Resp: 16    Temp: 98.1 °F (36.7 °C)    TempSrc: Oral    SpO2: 93%    Weight: 120 lb 9.6 oz (54.7 kg)    Height: 5' 1.5\" (1.562 m)        1. Have you been to the ER, urgent care clinic since your last visit? Hospitalized since your last visit? No    2. Have you seen or consulted any other health care providers outside of the [address] [place] [address] since your last visit? Include any pap smears or colon screening. No      Fall Risk Assessment, last 12 mths 2018   Able to walk? Yes   Fall in past 12 months? No     PHQ over the last two weeks 2018   Little interest or pleasure in doing things Not at all   Feeling down, depressed or hopeless Not at all   Total Score PHQ 2 0       General Health Questions   -During the past 4 weeks:   -how would you rate your health in general? Fair   -how often have you been bothered by feeling dizzy when standing up? Rare   -how much have you been bothered by bodily pain? not   -Have you noticed any hearing difficulties? no   -has your physical and emotional health limited your social activities with family or friends? yes    Emotional Health Questions   -Do you have a history of depression, anxiety, or emotional problems? No  -Over the past 2 weeks, have you felt down, depressed or hopeless? no  -Over the past 2 weeks, have you felt little interest or pleasure in doing things? no    Health Habits   Please describe your diet habits: Meat 2 times per week, fruits 3 times per day, Veggies 2 times per day.  Little amounts of sugar, Uses Sour dough bread, 1/2 gallon of distilled water a day, a cup of coffee and tea per day   Do you get 5 servings of fruits or vegetables daily? yes  Do you exercise regularly? no    Activities of Daily Living and Functional Status   -Do you need help with eating, walking, dressing, bathing, toileting, the phone, transportation, shopping, preparing meals, housework, laundry, medications or managing money? no  -In the past four weeks, was someone available to help you if you needed and wanted help with anything? yes  -Are you confident are you that you can control and manage most of your health problems? yes  -Have you been given information to help you keep track of your medications? yes  -How often do you have trouble taking your medications as prescribed? never    Fall Risk and Home Safety   Have you fallen 2 or more times in the past year? no   A. Does your home have rugs in the hallway? No   B. Do you have grab bars in the bathroom? Yes   C. Do you have handrails on the stairs? N/A   D. Do you have adequate lighting? Yes  Do you have smoke detectors and check them regularly?  yes  Do you have difficulties driving a car? yes  Do you always fasten your seat belt when you are in a car? no

## 2018-05-18 NOTE — PROGRESS NOTES
Denies dysuria    Here for Medicare Wellness visit  States that she feels much better now that she is using albuterol nebulizer    She declines further workup -- does not want to see pulmonology, does not want to have CT scan lung done, based on her smoking history    Also discussed history of weight loss -- now stable -- she does not desire to have any further workup done    Pt states that she has been taking her medications as prescribed    PE:  General -- awake, alert, cheerful  Neck -- supple  Lungs -- clear bilaterally  CV -- regular, S1S2  abd -- soft, NT, ND  Ext -- no edema, no tenderness

## 2018-05-18 NOTE — LETTER
5/29/2018 8:38 AM 
 
Ms. Florin Maldonado [address] [place] [address] Dear Florin Maldonado: 
 
Please find your most recent results below. Resulted Orders METABOLIC PANEL, COMPREHENSIVE Result Value Ref Range Glucose 82 65 - 99 mg/dL BUN 6 (L) 8 - 27 mg/dL Creatinine 0.63 0.57 - 1.00 mg/dL GFR est non-AA 89 >59 mL/min/1.73 GFR est  >59 mL/min/1.73  
 BUN/Creatinine ratio 10 (L) 12 - 28 Sodium 138 134 - 144 mmol/L Potassium 3.9 3.5 - 5.2 mmol/L Chloride 94 (L) 96 - 106 mmol/L  
 CO2 29 18 - 29 mmol/L Calcium 9.6 8.7 - 10.3 mg/dL Protein, total 8.0 6.0 - 8.5 g/dL Albumin 4.0 3.5 - 4.8 g/dL GLOBULIN, TOTAL 4.0 1.5 - 4.5 g/dL A-G Ratio 1.0 (L) 1.2 - 2.2 Bilirubin, total 0.3 0.0 - 1.2 mg/dL Alk. phosphatase 63 39 - 117 IU/L  
 AST (SGOT) 21 0 - 40 IU/L  
 ALT (SGPT) 12 0 - 32 IU/L Narrative Performed at:  [address]  [id number] : Jean Carlos Trujillo MD, Phone:  [phone] CBC W/O DIFF Result Value Ref Range WBC 9.8 3.4 - 10.8 x10E3/uL  
 RBC 4.59 3.77 - 5.28 x10E6/uL HGB 13.5 11.1 - 15.9 g/dL HCT 42.5 34.0 - 46.6 % MCV 93 79 - 97 fL  
 MCH 29.4 26.6 - 33.0 pg  
 MCHC 31.8 31.5 - 35.7 g/dL  
 RDW 13.6 12.3 - 15.4 % PLATELET 096 (H) 180 - 379 x10E3/uL Narrative Performed at:  [address]  [id number] : Jean Carlos Trujillo MD, Phone:  [phone] LIPID PANEL Result Value Ref Range Cholesterol, total 257 (H) 100 - 199 mg/dL Triglyceride 175 (H) 0 - 149 mg/dL HDL Cholesterol 80 >39 mg/dL VLDL, calculated 35 5 - 40 mg/dL LDL, calculated 142 (H) 0 - 99 mg/dL Narrative Performed at:  [address]  [id number] : Jean Carlos Trujillo MD, Phone:  [phone] TSH 3RD GENERATION Result Value Ref Range TSH 1.820 0.450 - 4.500 uIU/mL Narrative Performed at:  [address]  [id number] : Brandon Benítez MD, Phone:  [phone] CVD REPORT Result Value Ref Range INTERPRETATION Note Comment:  
   Supplemental report is available. Narrative Performed at:  [address] [place] [address]  [id number] : Francisco Giron MD, Phone:  [phone] RECOMMENDATIONS: 
 
Normal glucose level Normal kidney function Normal liver function test  
 
No anemia however platelet count is slightly elevated - would recommend re-check in one month -- if still elevated, recommend referral to Hematologist  
 
Cholesterol levels are elevated -- total cholesterol and \"bad\" cholesterol both elevated -- need to watch diet, consider fish oil supplements, or referral to Cardiology for evaluation Normal thyroid level If you at anytime would like to see Pulmonary please let me know -- still think it would be prudent to see them for baseline recommendations, but it is certainly your choice and decision It was nice to see you and have a nice summer Please call me if you have any questions: [phone] Sincerely, Ivonne Santana MD

## 2018-05-19 LAB
ALBUMIN SERPL-MCNC: 4 G/DL (ref 3.5–4.8)
ALBUMIN/GLOB SERPL: 1 {RATIO} (ref 1.2–2.2)
ALP SERPL-CCNC: 63 IU/L (ref 39–117)
ALT SERPL-CCNC: 12 IU/L (ref 0–32)
AST SERPL-CCNC: 21 IU/L (ref 0–40)
BILIRUB SERPL-MCNC: 0.3 MG/DL (ref 0–1.2)
BUN SERPL-MCNC: 6 MG/DL (ref 8–27)
BUN/CREAT SERPL: 10 (ref 12–28)
CALCIUM SERPL-MCNC: 9.6 MG/DL (ref 8.7–10.3)
CHLORIDE SERPL-SCNC: 94 MMOL/L (ref 96–106)
CHOLEST SERPL-MCNC: 257 MG/DL (ref 100–199)
CO2 SERPL-SCNC: 29 MMOL/L (ref 18–29)
CREAT SERPL-MCNC: 0.63 MG/DL (ref 0.57–1)
ERYTHROCYTE [DISTWIDTH] IN BLOOD BY AUTOMATED COUNT: 13.6 % (ref 12.3–15.4)
GFR SERPLBLD CREATININE-BSD FMLA CKD-EPI: 103 ML/MIN/1.73
GFR SERPLBLD CREATININE-BSD FMLA CKD-EPI: 89 ML/MIN/1.73
GLOBULIN SER CALC-MCNC: 4 G/DL (ref 1.5–4.5)
GLUCOSE SERPL-MCNC: 82 MG/DL (ref 65–99)
HCT VFR BLD AUTO: 42.5 % (ref 34–46.6)
HDLC SERPL-MCNC: 80 MG/DL
HGB BLD-MCNC: 13.5 G/DL (ref 11.1–15.9)
INTERPRETATION, 910389: NORMAL
LDLC SERPL CALC-MCNC: 142 MG/DL (ref 0–99)
MCH RBC QN AUTO: 29.4 PG (ref 26.6–33)
MCHC RBC AUTO-ENTMCNC: 31.8 G/DL (ref 31.5–35.7)
MCV RBC AUTO: 93 FL (ref 79–97)
PLATELET # BLD AUTO: 406 X10E3/UL (ref 150–379)
POTASSIUM SERPL-SCNC: 3.9 MMOL/L (ref 3.5–5.2)
PROT SERPL-MCNC: 8 G/DL (ref 6–8.5)
RBC # BLD AUTO: 4.59 X10E6/UL (ref 3.77–5.28)
SODIUM SERPL-SCNC: 138 MMOL/L (ref 134–144)
TRIGL SERPL-MCNC: 175 MG/DL (ref 0–149)
TSH SERPL DL<=0.005 MIU/L-ACNC: 1.82 UIU/ML (ref 0.45–4.5)
VLDLC SERPL CALC-MCNC: 35 MG/DL (ref 5–40)
WBC # BLD AUTO: 9.8 X10E3/UL (ref 3.4–10.8)

## 2018-05-21 RX ORDER — AMLODIPINE BESYLATE 10 MG/1
10 TABLET ORAL DAILY
Qty: 90 TAB | Refills: 3 | Status: SHIPPED | OUTPATIENT
Start: 2018-05-21 | End: 2019-05-14 | Stop reason: SDUPTHER

## 2018-05-21 NOTE — TELEPHONE ENCOUNTER
From: Florin Maldonado  To: Oneal Garcia MD  Sent: 5/21/2018 3:38 AM EDT  Subject: Medication Renewal Request    Original authorizing provider: Oneal Garcia MD    [facility] would like a refill of the following medications:  amLODIPine (NORVASC) 10 mg tablet Oneal Garcia MD]    Preferred pharmacy: [address] [place] [address], [address] [facility] [address]    Comment:

## 2018-05-28 NOTE — PROGRESS NOTES
Normal glucose level  Normal kidney function  Normal liver function test    No anemia however platelet count is slightly elevated - would recommend re-check in one month -- if still elevated, recommend referral to Hematologist    Cholesterol levels are elevated -- total cholesterol and \"bad\" cholesterol both elevated -- need to watch diet, consider fish oil supplements, or referral to Cardiology for evaluation    Normal thyroid level    If you at anytime would like to see Pulmonary please let me know -- still think it would be prudent to see them for baseline recommendations, but it is certainly your choice and decision    It was nice to see you and have a nice summer

## 2018-05-29 NOTE — PROGRESS NOTES
States that inhaler really helps  Uses distilled water with nebulizer    States that she has cough with activity and when she gets up in the am

## 2018-09-27 ENCOUNTER — OFFICE VISIT (OUTPATIENT)
Dept: FAMILY MEDICINE CLINIC | Age: 73
End: 2018-09-27

## 2018-09-27 VITALS
WEIGHT: 124 LBS | RESPIRATION RATE: 16 BRPM | TEMPERATURE: 98.7 F | HEIGHT: 61 IN | OXYGEN SATURATION: 99 % | DIASTOLIC BLOOD PRESSURE: 74 MMHG | BODY MASS INDEX: 23.41 KG/M2 | SYSTOLIC BLOOD PRESSURE: 128 MMHG | HEART RATE: 74 BPM

## 2018-09-27 DIAGNOSIS — Z28.21 INFLUENZA VACCINE REFUSED: ICD-10-CM

## 2018-09-27 DIAGNOSIS — H25.9 AGE-RELATED CATARACT OF BOTH EYES, UNSPECIFIED AGE-RELATED CATARACT TYPE: Primary | ICD-10-CM

## 2018-09-27 NOTE — PATIENT INSTRUCTIONS
Cataract Surgery: Before Your Surgery  What is cataract surgery? Cataracts are cloudy areas in the lens of your eye. Your lens is behind the colored part of your eye (iris). Its job is to focus light onto the back of your eye. In some people, cataracts prevent light from reaching the back of the eye. This can cause vision problems. Cataract surgery helps you see better. It replaces your natural lens, which has become cloudy, with a clear artificial one. There are two types of cataract surgery. Phacoemulsification (say \"nzmm-qm-hg-tip-pyk-utq-SAM-shun\") is the most common type. The doctor makes a small cut in your eye. This cut is called an incision. The doctor uses a special ultrasound tool to break your cloudy lens apart. Sometimes a laser is used too. Then he or she removes the small pieces of the lens through the incision. In most cases, the doctor then inserts an artificial lens through the incision. Most people do not need stitches, because the incision is so small. If the doctor is not able to put in an artificial lens, you can wear a contact lens or thick glasses in place of your natural lens. Extracapsular extraction is a less common type of cataract surgery. The doctor makes a larger incision to remove the whole lens at once. After the doctor removes the lens, he or she stitches up the incision. Recovery from this type of surgery takes longer. Before either surgery, the doctor puts numbing drops in your eye. Some doctors use a shot instead. You may also get medicine to make you feel relaxed. You probably will not feel much pain. The surgery takes about 20 to 40 minutes. After surgery, you may have a bandage or shield on your eye. You will probably go home from surgery after 1 hour in the recovery room. Most people see better in 1 to 3 days. You may be able to go back to work or your normal routine in a few days.  It could take 3 to 10 weeks for your eye to completely heal. After your eye heals, you may still need to wear glasses, especially for reading. Follow-up care is a key part of your treatment and safety. Be sure to make and go to all appointments, and call your doctor if you are having problems. It's also a good idea to know your test results and keep a list of the medicines you take. What happens before surgery?   Surgery can be stressful. This information will help you understand what you can expect. And it will help you safely prepare for surgery.   Preparing for surgery    · Understand exactly what surgery is planned, along with the risks, benefits, and other options. · Tell your doctors ALL the medicines, vitamins, supplements, and herbal remedies you take. Some of these can increase the risk of bleeding or interact with anesthesia.     · If you take blood thinners, such as warfarin (Coumadin), clopidogrel (Plavix), or aspirin, be sure to talk to your doctor. He or she will tell you if you should stop taking these medicines before your surgery. Make sure that you understand exactly what your doctor wants you to do.     · Your doctor will tell you which medicines to take or stop before your surgery. You may need to stop taking certain medicines a week or more before surgery. So talk to your doctor as soon as you can.     · If you have an advance directive, let your doctor know. It may include a living will and a durable power of  for health care. Bring a copy to the hospital. If you don't have one, you may want to prepare one. It lets your doctor and loved ones know your health care wishes. Doctors advise that everyone prepare these papers before any type of surgery or procedure. What happens on the day of surgery? · Follow the instructions exactly about when to stop eating and drinking. If you don't, your surgery may be canceled.  If your doctor told you to take your medicines on the day of surgery, take them with only a sip of water.     · Take a bath or shower before you come in for your surgery. Do not apply lotions, perfumes, deodorants, or nail polish.     · Take off all jewelry and piercings. And take out contact lenses, if you wear them.    At the hospital or surgery center   · Bring a picture ID.     · The area for surgery is often marked to make sure there are no errors.     · You will be kept comfortable and safe by your anesthesia provider. The anesthesia may make you sleep. Or it may just numb the area being worked on.     · The surgery will take about 20 to 40 minutes. Going home   · Be sure you have someone to drive you home. Anesthesia and pain medicine make it unsafe for you to drive.     · You will be given more specific instructions about recovering from your surgery. They will cover things like diet, wound care, follow-up care, driving, and getting back to your normal routine.     · You may have a bandage or patch over your eye. You may also have a clear shield over your eye. This prevents you from rubbing it. When should you call your doctor? · You have questions or concerns.     · You don't understand how to prepare for your surgery.     · You become ill before the surgery (such as fever, flu, or a cold).     · You need to reschedule or have changed your mind about having the surgery. Where can you learn more? Go to http://kem-prema.info/. Enter K474 in the search box to learn more about \"Cataract Surgery: Before Your Surgery. \"  Current as of: December 3, 2017  Content Version: 11.7  © 4227-1858 [employer]. Care instructions adapted under license by [employer] (which disclaims liability or warranty for this information). If you have questions about a medical condition or this instruction, always ask your healthcare professional. [place] [address] any warranty or liability for your use of this information.

## 2018-09-27 NOTE — MR AVS SNAPSHOT
[address] 
[phone] Patient: Ki Perez MRN: [id number] QGS:7[birth year] Visit Information Date & Time Provider Department Dept. Phone Encounter #  
 9/27/2018  1:00 PM Dara Castano MD [address] [facility] [phone] [id number] Follow-up Instructions Return if symptoms worsen or fail to improve. Upcoming Health Maintenance Date Due Hepatitis C Screening 1945 Shingrix Vaccine Age 50> (1 of 2) 1/9/1995 FOBT Q 1 YEAR AGE 50-75 1/9/1995 GLAUCOMA SCREENING Q2Y 1/9/2010 Pneumococcal 65+ Low/Medium Risk (1 of 2 - PCV13) 1/9/2010 Influenza Age 5 to Adult 8/1/2018 BREAST CANCER SCRN MAMMOGRAM 5/26/2019 DTaP/Tdap/Td series (2 - Td) 1/27/2021 Allergies as of 9/27/2018  Review Complete On: 9/27/2018 By: Dara Castano MD  
  
 Severity Noted Reaction Type Reactions Sulfa (Sulfonamide Antibiotics)  09/09/2010    Not Reported This Time Current Immunizations  Reviewed on 4/29/2014 Name Date Poliovirus vaccine 1/27/2011 Tdap 1/27/2011 Typhoid Vi Capsular Polysaccharide Vaccine 1/27/2011 Not reviewed this visit You Were Diagnosed With   
  
 Codes Comments Age-related cataract of both eyes, unspecified age-related cataract type    -  Primary ICD-10-CM: H25.9 ICD-9-CM: 366.10 Influenza vaccine refused     ICD-10-CM: Z28.21 ICD-9-CM: V64.06 Vitals BP Pulse Temp Resp Height(growth percentile) Weight(growth percentile) 128/74 74 98.7 °F (37.1 °C) (Oral) 16 5' 1\" (1.549 m) 124 lb (56.2 kg) LMP SpO2 BMI OB Status Smoking Status 06/20/2000 99% 23.43 kg/m2 Postmenopausal Former Smoker Vitals History BMI and BSA Data Body Mass Index Body Surface Area  
 23.43 kg/m 2 1.56 m 2 Preferred Pharmacy Pharmacy Name Phone [facility] [address] [facility] [id number] Levine, Susan. \Hospital [facility] and Outlook.\"" [phone] Your Updated Medication List  
  
   
This list is accurate as of 9/27/18  1:26 PM.  Always use your most recent med list.  
  
  
  
  
 * albuterol 90 mcg/actuation inhaler Commonly known as:  PROVENTIL HFA, VENTOLIN HFA, PROAIR HFA Take 1 Puff by inhalation every six (6) hours as needed for Wheezing. * albuterol 2.5 mg /3 mL (0.083 %) nebulizer solution Commonly known as:  PROVENTIL VENTOLIN  
use 1 vial via nebulizer once for 1 dose  
  
 amLODIPine 10 mg tablet Commonly known as:  Horris Bills Take 1 Tab by mouth daily. ANTACID CALCIUM PO Take  by mouth. aspirin 81 mg chewable tablet Take 325 mg by mouth daily. fluticasone-salmeterol 250-50 mcg/dose diskus inhaler Commonly known as:  ADVAIR Take 1 Puff by inhalation every twelve (12) hours. levothyroxine 100 mcg tablet Commonly known as:  SYNTHROID  
TAKE 1 TAB BY MOUTH DAILY (BEFORE BREAKFAST). M-VIT PO Take  by mouth.  
  
 melatonin Tab tablet Take  by mouth nightly. NASONEX 50 mcg/actuation nasal spray Generic drug:  mometasone Nebulizer & Compressor machine 1 Each by Does Not Apply route as needed. VITAMIN D3 2,000 unit Tab Generic drug:  cholecalciferol (vitamin D3) Take 1,000 Int'l Units by mouth. XYZAL 5 mg tablet Generic drug:  levocetirizine Take  by mouth. * Notice: This list has 2 medication(s) that are the same as other medications prescribed for you. Read the directions carefully, and ask your doctor or other care provider to review them with you. Follow-up Instructions Return if symptoms worsen or fail to improve. Patient Instructions Cataract Surgery: Before Your Surgery What is cataract surgery? Cataracts are cloudy areas in the lens of your eye.  Your lens is behind the colored part of your eye (iris). Its job is to focus light onto the back of your eye. In some people, cataracts prevent light from reaching the back of the eye. This can cause vision problems. Cataract surgery helps you see better. It replaces your natural lens, which has become cloudy, with a clear artificial one. There are two types of cataract surgery. Phacoemulsification (say \"sdco-yt-ep-yjs-ugm-ika-SAM-shun\") is the most common type. The doctor makes a small cut in your eye. This cut is called an incision. The doctor uses a special ultrasound tool to break your cloudy lens apart. Sometimes a laser is used too. Then he or she removes the small pieces of the lens through the incision. In most cases, the doctor then inserts an artificial lens through the incision. Most people do not need stitches, because the incision is so small. If the doctor is not able to put in an artificial lens, you can wear a contact lens or thick glasses in place of your natural lens. Extracapsular extraction is a less common type of cataract surgery. The doctor makes a larger incision to remove the whole lens at once. After the doctor removes the lens, he or she stitches up the incision. Recovery from this type of surgery takes longer. Before either surgery, the doctor puts numbing drops in your eye. Some doctors use a shot instead. You may also get medicine to make you feel relaxed. You probably will not feel much pain. The surgery takes about 20 to 40 minutes. After surgery, you may have a bandage or shield on your eye. You will probably go home from surgery after 1 hour in the recovery room. Most people see better in 1 to 3 days. You may be able to go back to work or your normal routine in a few days. It could take 3 to 10 weeks for your eye to completely heal. After your eye heals, you may still need to wear glasses, especially for reading. Follow-up care is a key part of your treatment and safety.  Be sure to make and go to all appointments, and call your doctor if you are having problems. It's also a good idea to know your test results and keep a list of the medicines you take. What happens before surgery? 
 Surgery can be stressful. This information will help you understand what you can expect. And it will help you safely prepare for surgery. 
 Preparing for surgery 
  · Understand exactly what surgery is planned, along with the risks, benefits, and other options. · Tell your doctors ALL the medicines, vitamins, supplements, and herbal remedies you take. Some of these can increase the risk of bleeding or interact with anesthesia.  
  · If you take blood thinners, such as warfarin (Coumadin), clopidogrel (Plavix), or aspirin, be sure to talk to your doctor. He or she will tell you if you should stop taking these medicines before your surgery. Make sure that you understand exactly what your doctor wants you to do.  
  · Your doctor will tell you which medicines to take or stop before your surgery. You may need to stop taking certain medicines a week or more before surgery. So talk to your doctor as soon as you can.  
  · If you have an advance directive, let your doctor know. It may include a living will and a durable power of  for health care. Bring a copy to the hospital. If you don't have one, you may want to prepare one. It lets your doctor and loved ones know your health care wishes. Doctors advise that everyone prepare these papers before any type of surgery or procedure. What happens on the day of surgery? · Follow the instructions exactly about when to stop eating and drinking. If you don't, your surgery may be canceled. If your doctor told you to take your medicines on the day of surgery, take them with only a sip of water.  
  · Take a bath or shower before you come in for your surgery. Do not apply lotions, perfumes, deodorants, or nail polish.   · Take off all jewelry and piercings. And take out contact lenses, if you wear them.  
 At the hospital or surgery center · Bring a picture ID.  
  · The area for surgery is often marked to make sure there are no errors.  
  · You will be kept comfortable and safe by your anesthesia provider. The anesthesia may make you sleep. Or it may just numb the area being worked on.  
  · The surgery will take about 20 to 40 minutes. Going home · Be sure you have someone to drive you home. Anesthesia and pain medicine make it unsafe for you to drive.  
  · You will be given more specific instructions about recovering from your surgery. They will cover things like diet, wound care, follow-up care, driving, and getting back to your normal routine.  
  · You may have a bandage or patch over your eye. You may also have a clear shield over your eye. This prevents you from rubbing it. When should you call your doctor? · You have questions or concerns.  
  · You don't understand how to prepare for your surgery.  
  · You become ill before the surgery (such as fever, flu, or a cold).  
  · You need to reschedule or have changed your mind about having the surgery. Where can you learn more? Go to http://kem-prema.info/. Enter K474 in the search box to learn more about \"Cataract Surgery: Before Your Surgery. \" Current as of: December 3, 2017 Content Version: 11.7 © 6590-8747 [employer]. Care instructions adapted under license by [employer] (which disclaims liability or warranty for this information). If you have questions about a medical condition or this instruction, always ask your healthcare professional. [place] [address] any warranty or liability for your use of this information. Introducing [facility]! Dear Mekhi Mascorro: Thank you for requesting a food.de account.   Our records indicate that you have previously registered for a Plaid account but its currently inactive. Please call our Plaid support line at [phone]. Additional Information If you have questions, please visit the Frequently Asked Questions section of the [employer]aid website at [email or web address] Remember, Plaid is NOT to be used for urgent needs. For medical emergencies, dial 911. Now available from your iPhone and Android! Please provide this summary of care documentation to your next provider. Your primary care clinician is listed as [address] [place] [address]. If you have any questions after today's visit, please call [phone].

## 2018-09-27 NOTE — PROGRESS NOTES
Preoperative Evaluation for Elva Lu     9/27/2018  Chief Complaint   Patient presents with    Pre-op Exam     cataract surgery--15th of oct and the 22nd       HPI:   Elva Lu is a 68 y.o. female referred for evaluation by:Dr. Michelle Morin for Pre- Op Evaluation. Please see encounter details and orders for consultative summary. Type of surgery and indication: Cataract removal  Surgery site : Left and right eyes  Anesthesia type: Local  Date of procedure:  October 15, 2018 ( Left eye)  and October 22,2018 ( Right eye)  Dr. Isaac Hernandez ( ophthalmologist)     Review of Systems   Review of Systems   Constitutional: Negative for chills and fever. HENT: Negative for congestion, ear discharge, hearing loss and nosebleeds. Eyes: Positive for blurred vision and double vision. Negative for photophobia and pain. Respiratory: Negative for cough, hemoptysis, shortness of breath and wheezing. Cardiovascular: Negative for chest pain, palpitations, claudication and leg swelling. Gastrointestinal: Negative for heartburn, nausea and vomiting. Genitourinary: Negative for dysuria and frequency. Musculoskeletal: Negative for myalgias and neck pain. Skin: Negative for itching and rash. Neurological: Negative for dizziness and headaches. Psychiatric/Behavioral: Negative for depression. The patient does not have insomnia.         Inherent Risk of Surgery   Surgical risk:  Low  Patient Cardiac Risk Assessment   Revised Cardiac Risk Index (RCRI)  High Risk Surgery No  Hx of Heart Failure No  Hx of ischemic heart disease No  Hx of CVD No  DM requiring insulin therapy No  Preoperative serum Cr >2.0 mg/dl  N/A    Rate if cardiac death, nonfatal MI, nonfatal cardiac arrest by number of risk factor  None - 0.4%  1  1.0%  2 2.4%  3+ 5.4%    AIN/AHA 2007 Guidelines:   1) Surgery Emergency, Non-cardiac -> to surgery  2) If not, look at clinical predictors  Major Intermediate Minor   Unstable CAD (recent MI, severe angina) Mild angina Advanced Age   Decompensated CHF Prior MI Abnormal EKG   Severe Valvular Disease Compensated/Prior CHF Rhythm other than sinus   Arrhythmias (AV block, uncontrolled vent rate, sxs) Diabetes Low METS (<4)    Renal Insuficiency Hx of CVA     Uncontrolled HTN       METS     <4 METS >4 METS   Care for self Climb a flight of stairs or a hill   Walk indoors around housse Walk on level ground at 4 mph   Walk 2-3 blocks on level ground (2-3 mph) Run a short distance   Light work around house (dust, dishes) Heavy work around house (scrub floors, move furniture)     Prior cardiac evaluation:  None    Other Risk Factors:   Screening for ETOH use:  Done and low risk  Smoking status:  Former smoker, quit > 20 years ago    Personal or FH of bleeding problems:  no  Personal or FH of blood clots:  no  Personal or FH of anesthesia problems:  no    Pulmonary Risk:  Asthma or COPD:  Yes, mild intermittent  Surgery close to diaphragm:  no  Known ELLE:  no    Past Medical, Surgical, Social History   Allergies: Allergies   Allergen Reactions    Sulfa (Sulfonamide Antibiotics) Not Reported This Time     Latex allergy: no  Prior reactions to anesthesia:  None      Current Outpatient Prescriptions   Medication Sig    amLODIPine (NORVASC) 10 mg tablet Take 1 Tab by mouth daily.  levothyroxine (SYNTHROID) 100 mcg tablet TAKE 1 TAB BY MOUTH DAILY (BEFORE BREAKFAST).  albuterol (PROVENTIL VENTOLIN) 2.5 mg /3 mL (0.083 %) nebulizer solution use 1 vial via nebulizer once for 1 dose    fluticasone-salmeterol (ADVAIR) 250-50 mcg/dose diskus inhaler Take 1 Puff by inhalation every twelve (12) hours.  NASONEX 50 mcg/actuation nasal spray     Nebulizer & Compressor machine 1 Each by Does Not Apply route as needed.  levocetirizine (XYZAL) 5 mg tablet Take  by mouth.  melatonin tab tablet Take  by mouth nightly.     albuterol (PROVENTIL HFA, VENTOLIN HFA, PROAIR HFA) 90 mcg/actuation inhaler Take 1 Puff by inhalation every six (6) hours as needed for Wheezing.  CALCIUM CARBONATE (ANTACID CALCIUM PO) Take  by mouth.  cholecalciferol, vitamin D3, (VITAMIN D3) 2,000 unit Tab Take 1,000 Int'l Units by mouth.  aspirin 81 mg chewable tablet Take 325 mg by mouth daily.  PV W-O BONY/FERROUS FUMARATE/FA (M-VIT PO) Take  by mouth. No current facility-administered medications for this visit. Past Medical History:   Diagnosis Date    Asthma     Hypertension      Past Surgical History:   Procedure Laterality Date    HX DILATION AND CURETTAGE       Social History   Substance Use Topics    Smoking status: Former Smoker     Quit date: 1/1/1999    Smokeless tobacco: Never Used    Alcohol use No       Objective     Vitals:    09/27/18 1256   BP: 128/74   Pulse: 74   Resp: 16   Temp: 98.7 °F (37.1 °C)   TempSrc: Oral   SpO2: 99%   Weight: 124 lb (56.2 kg)   Height: 5' 1\" (1.549 m)       Constitutional:  Appears well,  No Acute Distress, Vitals noted  Psychiatric:   Affect normal, Alert and Oriented to person/place/time    Eyes:   Pupils equally round and reactive, EOMI, conjunctiva clear, eyelids normal  ENT:   External ears and nose normal/lips, teeth=OK/gums normal, TMs and Orophyarynx normal  Neck:   general inspection and Thyroid normal.  No abnormal cervical or supraclavicular nodes    Lungs:   clear to auscultation, good respiratory effort  Heart: Ausculation normal.  Regular rhythm. No cardiac murmurs. No carotid bruits or palpable thrills  Chest wall normal    Extremities:   without edema, good peripheral pulses  Skin:   Warm to palpation, without rashes, bruising, or suspicious lesions     No results found for this or any previous visit (from the past 24 hour(s)). EKG Results     None          Assessment an PLan     69 yo female who is here for preop evaluation.     Assessment/Plan:     ICD-10-CM ICD-9-CM    1. Age-related cataract of both eyes, unspecified age-related cataract type H25.9 366.10    2. Influenza vaccine refused Z28.21 V64.06          1) Preoperative Clearance  Per RCRI, the patient has a 0.4% risk of cardiac death, nonfatal MI, nonfatal cardiac arrest based on risk factors of underlying mild intermittent asthma. Per ACC/AHA guidelines, patient is low risk for a(n) low risk surgery and may proceed to planned surgery with the above noted risk. No need for future evaluation.

## 2019-04-29 DIAGNOSIS — E03.9 HYPOTHYROIDISM, UNSPECIFIED TYPE: Primary | ICD-10-CM

## 2019-04-29 RX ORDER — LEVOTHYROXINE SODIUM 100 UG/1
TABLET ORAL
Qty: 90 TAB | Refills: 2 | Status: SHIPPED | OUTPATIENT
Start: 2019-04-29 | End: 2020-01-23

## 2019-05-15 RX ORDER — AMLODIPINE BESYLATE 10 MG/1
TABLET ORAL
Qty: 90 TAB | Refills: 2 | Status: SHIPPED | OUTPATIENT
Start: 2019-05-15 | End: 2020-02-17

## 2019-05-21 ENCOUNTER — OFFICE VISIT (OUTPATIENT)
Dept: FAMILY MEDICINE CLINIC | Age: 74
End: 2019-05-21

## 2019-05-21 VITALS
DIASTOLIC BLOOD PRESSURE: 78 MMHG | RESPIRATION RATE: 18 BRPM | HEART RATE: 76 BPM | SYSTOLIC BLOOD PRESSURE: 141 MMHG | TEMPERATURE: 97.8 F | WEIGHT: 108 LBS | BODY MASS INDEX: 20.39 KG/M2 | HEIGHT: 61 IN | OXYGEN SATURATION: 95 %

## 2019-05-21 DIAGNOSIS — Z12.39 BREAST CANCER SCREENING: ICD-10-CM

## 2019-05-21 DIAGNOSIS — Z00.00 MEDICARE ANNUAL WELLNESS VISIT, SUBSEQUENT: Primary | ICD-10-CM

## 2019-05-21 DIAGNOSIS — Z12.11 COLON CANCER SCREENING: ICD-10-CM

## 2019-05-21 DIAGNOSIS — R06.2 WHEEZING: ICD-10-CM

## 2019-05-21 DIAGNOSIS — I15.9 SECONDARY HYPERTENSION: ICD-10-CM

## 2019-05-21 NOTE — PROGRESS NOTES
Chief Complaint   Patient presents with   [address] [place] [address] Annual Wellness Visit     1. Have you been to the ER, urgent care clinic since your last visit? Hospitalized since your last visit? No    2. Have you seen or consulted any other health care providers outside of the [address] [place] [address] since your last visit? Include any pap smears or colon screening.  No

## 2019-05-21 NOTE — PROGRESS NOTES
[address]  [address] Karlie Morris   [phone]             Date of visit: 5/21/2019       This is an Subsequent Medicare Annual Wellness Visit (AWV). I have reviewed the patient's medical history in detail and updated the computerized patient record. Derrick Mazariegos is a 76 y.o. female   History obtained from: patient    Concerns today   Cataract surgery October -- vision is much improved  States that she had it done sooner  States that she stopped taking steroid inhaler -- currently uses \"daytime\" and \"nighttime\" combination medications if she difficulty breathing -- both contain tylenol and dextromethrophan, day has phenylephrine, night has doxylamine  Advised that phenylephrine could raise her blood pressure    History     Patient Active Problem List   Diagnosis Code    HTN (hypertension) I10    Hypercholesterolemia E78.00    Hypothyroid E03.9    Asthma J45.909    Osteoporosis M81.0    Allergic rhinitis due to allergen J30.9    Former smoker Z87.891    Chronic cough R05    Unintentional weight loss R63.4    Breast cancer screening Z12.31     Past Medical History:   Diagnosis Date    Asthma     Hypertension       Past Surgical History:   Procedure Laterality Date    HX CATARACT REMOVAL  10/2018    HX DILATION AND CURETTAGE       Allergies   Allergen Reactions    Sulfa (Sulfonamide Antibiotics) Not Reported This Time     Current Outpatient Medications   Medication Sig Dispense Refill    pneumococcal 13 kurt conj dip (PREVNAR-13) 0.5 mL syrg injection 0.5 mL by IntraMUSCular route once for 1 dose. 0.5 mL 0    amLODIPine (NORVASC) 10 mg tablet TAKE ONE TABLET BY MOUTH ONE TIME DAILY  90 Tab 2    levothyroxine (SYNTHROID) 100 mcg tablet TAKE ONE TABLET BY MOUTH ONE TIME DAILY (BEFORE BREAKFAST).  90 Tab 2    VENTOLIN HFA 90 mcg/actuation inhaler INHALE ONE PUFF BY MOUTH EVERY 6 HOURS AS NEEDED FOR WHEEZING 1 Inhaler 3    albuterol (PROVENTIL VENTOLIN) 2.5 mg /3 mL (0.083 %) nebulizer solution use 1 vial via nebulizer once for 1 dose 75 Each 0    NASONEX 50 mcg/actuation nasal spray       Nebulizer & Compressor machine 1 Each by Does Not Apply route as needed. 1 Each 0    CALCIUM CARBONATE (ANTACID CALCIUM PO) Take  by mouth.  cholecalciferol, vitamin D3, (VITAMIN D3) 2,000 unit Tab Take 1,000 Int'l Units by mouth.  aspirin 81 mg chewable tablet Take 325 mg by mouth daily.  PV W-O BONY/FERROUS FUMARATE/FA (M-VIT PO) Take  by mouth.  fluticasone-salmeterol (ADVAIR) 250-50 mcg/dose diskus inhaler Take 1 Puff by inhalation every twelve (12) hours. 1 Inhaler 3    levocetirizine (XYZAL) 5 mg tablet Take  by mouth.  melatonin tab tablet Take  by mouth nightly. Family History   Problem Relation Age of Onset    Heart Disease Mother      Social History     Tobacco Use    Smoking status: Former Smoker     Last attempt to quit: 1999     Years since quittin.3    Smokeless tobacco: Never Used   Substance Use Topics    Alcohol use: No       Specialists/Care Team   Marlys Cast has established care with the following healthcare providers:  Patient Care Team:  Elif Amaya MD as PCP - General      Health Risk Assessment     Demographics   female  76 y.o. General Health Questions   -During the past 4 weeks:   -how would you rate your health in general? Good   -how often have you been bothered by feeling dizzy when standing up? rarely   -how much have you been bothered by bodily pain? mildly   -Have you noticed any hearing difficulties? no   -has your physical and emotional health limited your social activities with family or friends?  yes    Emotional Health Questions   -Do you have a history of depression, anxiety, or emotional problems? no  -Over the past 2 weeks, have you felt down, depressed or hopeless? no  -Over the past 2 weeks, have you felt little interest or pleasure in doing things? no    Health Habits   Please describe your diet habits: \"I eat what I want\"  Do you get 5 servings of fruits or vegetables daily? yes and n/a  Do you exercise regularly? no    Activities of Daily Living and Functional Status   -Do you need help with eating, walking, dressing, bathing, toileting, the phone, transportation, shopping, preparing meals, housework, laundry, medications or managing money? no  -In the past four weeks, was someone available to help you if you needed and wanted help with anything? yes  -Are you confident are you that you can control and manage most of your health problems? yes  -Have you been given information to help you keep track of your medications? yes  -How often do you have trouble taking your medications as prescribed? never    Fall Risk and Home Safety   Have you fallen 2 or more times in the past year? no  Does your home have rugs in the hallway, lack grab bars in the bathroom, lack handrails on the stairs or have poor lighting? no  Do you have smoke detectors and check them regularly? yes  Do you have difficulties driving a car? no  Do you always fasten your seat belt when you are in a car? yes    Review of Systems (if indicated for problems addressed today)   Review of Systems - Negative except unintentional weight loss, occasional wheezing      Physical Examination     Vitals:    05/21/19 1038   BP: 141/78   Pulse: 76   Resp: 18   Temp: 97.8 °F (36.6 °C)   TempSrc: Oral   SpO2: 95%   Weight: 108 lb (49 kg)   Height: 5' 1\" (1.549 m)     Body mass index is 20.41 kg/m².      Was the patient's timed Up & Go test unsteady or longer than 30 seconds? no    General -- awake, alert, cooperative  Neck -- supple, no significant adenopathy, no thyromegaly  Lungs --  symmetric movement, end expiratory wheezing with prolonged expiratory phase  CV -- regular, S1S2  abd -- soft, ND, NT  Ext -- no edema, + peripheral pulses    Evaluation of Cognitive Function   Mood/affect: happy  Orientation: Person, Place, Time and Situation  Appearance: age appropriate and casually dressed      Preventive Services     Discussed today Done Previously Not Needed    X order given for prevnar   Pneumococcal vaccines    x  Flu vaccine     x Hepatitis B vaccine (if at risk)    x  Shingles vaccine    x  TDAP vaccine   x   Mammogram     X  Pap smear   x   Colorectal cancer screening    x  Low-dose CT for lung cancer screening    x  Bone mineral density test      Glaucoma screening      Cholesterol test                       Discussion of Advance Directive   Discussed with Ilan Badillo her ability to prepare and advance directive in the case that an injury or illness causes her to be unable to make health care decisions. Assessment/Plan   Z00.00    ICD-10-CM ICD-9-CM    1. Wheezing R06.2 786.07 REFERRAL TO PULMONARY DISEASE   2. Breast cancer screening Z12.31 V76.10 HARPAL 3D HARMEET W MAMMO BI SCREENING INCL CAD   3. Secondary hypertension I15.9 405.99 LIPID PANEL      METABOLIC PANEL, COMPREHENSIVE      CBC W/O DIFF      HEMOGLOBIN A1C WITH EAG      TSH 3RD GENERATION   4. Colon cancer screening Z12.11 V76.51 REFERRAL TO GASTROENTEROLOGY       Orders Placed This Encounter    HARPAL 3D HARMEET W MAMMO BI SCREENING INCL CAD    LIPID PANEL    METABOLIC PANEL, COMPREHENSIVE    CBC W/O DIFF    HEMOGLOBIN A1C WITH EAG    TSH 3RD GENERATION    [facility]    REFERRAL TO GASTROENTEROLOGY    pneumococcal 13 kurt conj dip (PREVNAR-13) 0.5 mL syrg injection       Discussed importance of followup with pulmonary as it was abnormal in 5/2017 and she has continued to have wheezing, as well as unintentional weight loss. Also discussed osteoporosis which was diagnosed previously -- pt declines using a bisphosphonate. Declines new shingles vaccine.     Juliet Davila MD

## 2019-05-22 LAB
ALBUMIN SERPL-MCNC: 4.5 G/DL (ref 3.5–4.8)
ALBUMIN/GLOB SERPL: 1.4 {RATIO} (ref 1.2–2.2)
ALP SERPL-CCNC: 63 IU/L (ref 39–117)
ALT SERPL-CCNC: 12 IU/L (ref 0–32)
AST SERPL-CCNC: 23 IU/L (ref 0–40)
BILIRUB SERPL-MCNC: 0.3 MG/DL (ref 0–1.2)
BUN SERPL-MCNC: 6 MG/DL (ref 8–27)
BUN/CREAT SERPL: 11 (ref 12–28)
CALCIUM SERPL-MCNC: 9.8 MG/DL (ref 8.7–10.3)
CHLORIDE SERPL-SCNC: 95 MMOL/L (ref 96–106)
CHOLEST SERPL-MCNC: 258 MG/DL (ref 100–199)
CO2 SERPL-SCNC: 26 MMOL/L (ref 20–29)
CREAT SERPL-MCNC: 0.56 MG/DL (ref 0.57–1)
ERYTHROCYTE [DISTWIDTH] IN BLOOD BY AUTOMATED COUNT: 14.3 % (ref 12.3–15.4)
EST. AVERAGE GLUCOSE BLD GHB EST-MCNC: 123 MG/DL
GLOBULIN SER CALC-MCNC: 3.3 G/DL (ref 1.5–4.5)
GLUCOSE SERPL-MCNC: 78 MG/DL (ref 65–99)
HBA1C MFR BLD: 5.9 % (ref 4.8–5.6)
HCT VFR BLD AUTO: 43.3 % (ref 34–46.6)
HDLC SERPL-MCNC: 72 MG/DL
HGB BLD-MCNC: 13.6 G/DL (ref 11.1–15.9)
INTERPRETATION, 910389: NORMAL
LDLC SERPL CALC-MCNC: 133 MG/DL (ref 0–99)
MCH RBC QN AUTO: 30.1 PG (ref 26.6–33)
MCHC RBC AUTO-ENTMCNC: 31.4 G/DL (ref 31.5–35.7)
MCV RBC AUTO: 96 FL (ref 79–97)
PLATELET # BLD AUTO: 386 X10E3/UL (ref 150–450)
POTASSIUM SERPL-SCNC: 4.6 MMOL/L (ref 3.5–5.2)
PROT SERPL-MCNC: 7.8 G/DL (ref 6–8.5)
RBC # BLD AUTO: 4.52 X10E6/UL (ref 3.77–5.28)
SODIUM SERPL-SCNC: 139 MMOL/L (ref 134–144)
TRIGL SERPL-MCNC: 267 MG/DL (ref 0–149)
TSH SERPL DL<=0.005 MIU/L-ACNC: 1.01 UIU/ML (ref 0.45–4.5)
VLDLC SERPL CALC-MCNC: 53 MG/DL (ref 5–40)
WBC # BLD AUTO: 10.9 X10E3/UL (ref 3.4–10.8)

## 2019-05-24 NOTE — PROGRESS NOTES
Cholesterol level is high -- based on elevated total cholesterol, triglycerides, and \"bad\" cholesterol, you should be on a cholesterol lowering medication -- your 10 year cardiovascular risk = 23.2%    Normal kidney function    Normal glucose level    Normal liver function tests    Your white blood cell count is just a little high (I don't think this is anything to be concerned about)    No anemia    Glucose level is elevated -- in the prediabetes range -- would recommend starting medication -- metformin --- to keep it from progressing to diabetes    Normal thyroid function    I think it is important that you followup with pulmonary regarding your abnormal CT scan

## 2019-06-06 NOTE — PROGRESS NOTES
6/6/19  Called patient to discuss recent lab results -- patient reported she had received letter in the mail. Declined \"statin\" for elevated cholesterol -- concerned about side effects of statins.     Encouraged to followup with pulmonary regarding changes c/w emphysema and enlarged lymph nodes    Pt stated she would like to try diet and exercise for prediabetes    Pt stated that she had mammogram scheduled for next week    Pt did not have any questions

## 2019-06-12 ENCOUNTER — HOSPITAL ENCOUNTER (OUTPATIENT)
Dept: MAMMOGRAPHY | Age: 74
Discharge: HOME | End: 2019-06-12
Attending: FAMILY MEDICINE
Payer: MEDICARE

## 2019-06-12 DIAGNOSIS — Z12.39 BREAST CANCER SCREENING: ICD-10-CM

## 2019-06-12 PROCEDURE — 77063 BREAST TOMOSYNTHESIS BI: CPT

## 2019-06-21 ENCOUNTER — TELEPHONE (OUTPATIENT)
Dept: FAMILY MEDICINE CLINIC | Age: 74
End: 2019-06-21

## 2019-06-21 NOTE — TELEPHONE ENCOUNTER
My chart e-mail. FW: Referral Request     From  Delbert Batista LPN To  [address]  6/20/2019  2:53 PM   Previous Messages      ----- Message -----   From: Juan Khalil   Sent: 6/20/2019  10:33 AM   To: [facility] Nurse   Subject: Referral Request                                 ----- Message from [address] [place] [address] [address], [facility] sent at 6/20/2019 10:33 AM EDT -----     Please fax my medical records re asthma and CT scan info to [facility] before my scheduled appointment on 7/11/19. Thank you!    Fax: [phone]

## 2019-06-25 ENCOUNTER — HOSPITAL ENCOUNTER (OUTPATIENT)
Dept: GENERAL RADIOLOGY | Age: 74
Discharge: HOME | End: 2019-06-25
Attending: INTERNAL MEDICINE
Payer: MEDICARE

## 2019-06-25 DIAGNOSIS — R06.02 SHORTNESS OF BREATH: ICD-10-CM

## 2019-06-25 PROCEDURE — 71046 X-RAY EXAM CHEST 2 VIEWS: CPT

## 2019-07-24 ENCOUNTER — HOSPITAL ENCOUNTER (OUTPATIENT)
Dept: CT IMAGING | Age: 74
Discharge: HOME | End: 2019-07-24
Attending: INTERNAL MEDICINE
Payer: MEDICARE

## 2019-07-24 DIAGNOSIS — R59.1 LYMPHADENOPATHY: ICD-10-CM

## 2019-07-24 PROCEDURE — 74011636320 HC RX REV CODE- 636/320

## 2019-07-24 PROCEDURE — 77030003560 HC NDL HUBR BARD -A

## 2019-07-24 PROCEDURE — 71260 CT THORAX DX C+: CPT

## 2019-07-24 RX ADMIN — IOPAMIDOL 100 ML: 612 INJECTION, SOLUTION INTRAVENOUS at 10:19

## 2019-12-05 PROBLEM — J43.8 OTHER EMPHYSEMA (HCC): Status: ACTIVE | Noted: 2019-12-05

## 2019-12-05 RX ORDER — FLUTICASONE PROPIONATE AND SALMETEROL 250; 50 UG/1; UG/1
1 POWDER RESPIRATORY (INHALATION) EVERY 12 HOURS
Qty: 1 INHALER | Refills: 3 | Status: SHIPPED | OUTPATIENT
Start: 2019-12-05

## 2020-02-17 RX ORDER — AMLODIPINE BESYLATE 10 MG/1
TABLET ORAL
Qty: 90 TAB | Refills: 2 | Status: SHIPPED | OUTPATIENT
Start: 2020-02-17 | End: 2020-05-02 | Stop reason: SDUPTHER

## 2020-04-21 RX ORDER — LEVOTHYROXINE SODIUM 100 UG/1
TABLET ORAL
Qty: 90 TAB | Refills: 0 | Status: SHIPPED | OUTPATIENT
Start: 2020-04-21 | End: 2020-05-26 | Stop reason: SDUPTHER

## 2020-04-23 ENCOUNTER — PATIENT MESSAGE (OUTPATIENT)
Dept: FAMILY MEDICINE CLINIC | Age: 75
End: 2020-04-23

## 2020-04-27 NOTE — TELEPHONE ENCOUNTER
I called and scheduled patient for:   Tuesday, May 26, 2020 08:45 AM f [facility]MAIN OFFICE, [facility], Telemedicine 30 My Chart, 30min  cancel or reschedule  Telemedicine 30 My Chart: For visits related to [address] 4.27.2020    Close enc

## 2020-04-27 NOTE — TELEPHONE ENCOUNTER
----- Message from Gita Huffman LPN sent at 2/12/9589  4:26 PM EDT -----  Regarding: FW: Non-Urgent Medical Question  Contact: [phone]  Please reschedule patient on or after May 21st for medicare wellness. Thanks,  Lizzie Bey.  ----- Message -----  From: Ericka Brian  Sent: 4/24/2020  12:23 PM EDT  To: Gita Huffman LPN  Subject: FW: Non-Urgent Medical Question                  I don't see anything scheduled either?  ----- Message -----  From: Nacho Smart LPN  Sent: 9/69/4449   1:34 PM EDT  To: Skye Bowling  Subject: FW: Non-Urgent Medical Question                  Can you please assist?    I did not see appointment scheduled for May 5th. Thanks,  Lizzie Bey.  ----- Message -----  From: Gurpreet Manning  Sent: 4/23/2020  11:24 AM EDT  To: [place] Nurse  Subject: Non-Urgent Medical Question                      I have a Virtual appt on May 5. But Medicare says it must be May 21 or later for annual Wellness visit. Please reschedule my appt.

## 2020-05-06 RX ORDER — AMLODIPINE BESYLATE 10 MG/1
10 TABLET ORAL DAILY
Qty: 90 TAB | Refills: 2 | Status: SHIPPED | OUTPATIENT
Start: 2020-05-06 | End: 2020-06-16 | Stop reason: SDUPTHER

## 2020-05-26 ENCOUNTER — VIRTUAL VISIT (OUTPATIENT)
Dept: FAMILY MEDICINE CLINIC | Age: 75
End: 2020-05-26

## 2020-05-26 DIAGNOSIS — R73.03 PREDIABETES: ICD-10-CM

## 2020-05-26 DIAGNOSIS — I10 ESSENTIAL HYPERTENSION: ICD-10-CM

## 2020-05-26 DIAGNOSIS — Z00.00 MEDICARE ANNUAL WELLNESS VISIT, SUBSEQUENT: Primary | ICD-10-CM

## 2020-05-26 DIAGNOSIS — J43.8 OTHER EMPHYSEMA (HCC): ICD-10-CM

## 2020-05-26 DIAGNOSIS — Z11.59 NEED FOR HEPATITIS C SCREENING TEST: ICD-10-CM

## 2020-05-26 DIAGNOSIS — Z13.31 SCREENING FOR DEPRESSION: ICD-10-CM

## 2020-05-26 DIAGNOSIS — E03.9 ACQUIRED HYPOTHYROIDISM: ICD-10-CM

## 2020-05-26 RX ORDER — LEVOTHYROXINE SODIUM 100 UG/1
100 TABLET ORAL
Qty: 90 TAB | Refills: 3 | Status: SHIPPED | OUTPATIENT
Start: 2020-05-26 | End: 2021-03-12 | Stop reason: SDUPTHER

## 2020-05-26 NOTE — PATIENT INSTRUCTIONS
Medicare Wellness Visit, Female The best way to live healthy is to have a lifestyle where you eat a well-balanced diet, exercise regularly, limit alcohol use, and quit all forms of tobacco/nicotine, if applicable. Regular preventive services are another way to keep healthy. Preventive services (vaccines, screening tests, monitoring & exams) can help personalize your care plan, which helps you manage your own care. Screening tests can find health problems at the earliest stages, when they are easiest to treat. [facility] follows the current, evidence-based guidelines published by the [facility] ([facility]) when recommending preventive services for our patients. Because we follow these guidelines, sometimes recommendations change over time as research supports it. (For example, mammograms used to be recommended annually. Even though Medicare will still pay for an annual mammogram, the newer guidelines recommend a mammogram every two years for women of average risk). Of course, you and your doctor may decide to screen more often for some diseases, based on your risk and your co-morbidities (chronic disease you are already diagnosed with). Preventive services for you include: - Medicare offers their members a free annual wellness visit, which is time for you and your primary care provider to discuss and plan for your preventive service needs. Take advantage of this benefit every year! 
-All adults over the age of 72 should receive the recommended pneumonia vaccines. Current USPSTF guidelines recommend a series of two vaccines for the best pneumonia protection.  
-All adults should have a flu vaccine yearly and a tetanus vaccine every 10 years.  
-All adults age 48 and older should receive the shingles vaccines (series of two vaccines). -All adults age 38-68 who are overweight should have a diabetes screening test once every three years. -All adults born between [birth year] and 1965 should be screened once for Hepatitis C. 
-Other screening tests and preventive services for persons with diabetes include: an eye exam to screen for diabetic retinopathy, a kidney function test, a foot exam, and stricter control over your cholesterol.  
-Cardiovascular screening for adults with routine risk involves an electrocardiogram (ECG) at intervals determined by your doctor.  
-Colorectal cancer screenings should be done for adults age 54-65 with no increased risk factors for colorectal cancer. There are a number of acceptable methods of screening for this type of cancer. Each test has its own benefits and drawbacks. Discuss with your doctor what is most appropriate for you during your annual wellness visit. The different tests include: colonoscopy (considered the best screening method), a fecal occult blood test, a fecal DNA test, and sigmoidoscopy. 
 
-A bone mass density test is recommended when a woman turns 65 to screen for osteoporosis. This test is only recommended one time, as a screening. Some providers will use this same test as a disease monitoring tool if you already have osteoporosis. -Breast cancer screenings are recommended every other year for women of normal risk, age 54-69. 
-Cervical cancer screenings for women over age 72 are only recommended with certain risk factors. Here is a list of your current Health Maintenance items (your personalized list of preventive services) with a due date: 
Health Maintenance Due Topic Date Due  
 Hepatitis C Test  1945  Shingles Vaccine (1 of 2) 01/09/1995  Pneumococcal Vaccine (2 of 2 - PPSV23) 05/23/2020 Elizabeth Tovar Annual Well Visit  05/21/2020

## 2020-05-26 NOTE — PROGRESS NOTES
This is the Subsequent Medicare Annual Wellness Exam, performed 12 months or more after the Initial AWV or the last Subsequent AWV    Consent: Melita Weeks, who was seen by synchronous (real-time) audio-video technology, and/or her healthcare decision maker, is aware that this patient-initiated, Telehealth encounter on 5/26/2020 is a billable service. While AWVs are fully covered by Medicare, any services rendered on this date that are not included in an AWV are subject to additional billing, with coverage as determined by her insurance carrier. She is aware that she may receive a bill for any such additional services and has provided verbal consent to proceed: Yes. I have reviewed the patient's medical history in detail and updated the computerized patient record. History     Acute concerns today:  -no acute concerns today    Chronic medical conditions:  -Hypothyroidism. Stable. The patient is compliant with Synthroid.  -Hypertension. BP is at goal. Home readings in 120s/70s. No chest pain, SOB, leg swelling  -COPD/Asthma. Stable. Using supplemental oxygen (3L) during the activity. Seeing pulmonologist (Dr. Felisha Chakraborty)    Patient Active Problem List   Diagnosis Code    HTN (hypertension) I10    Hypercholesterolemia E78.00    Hypothyroid E03.9    Asthma J45.909    Osteoporosis M81.0    Allergic rhinitis due to allergen J30.9    Former smoker Z87.891    Chronic cough R05    Unintentional weight loss R63.4    Breast cancer screening Z12.39    Other emphysema ([facility] [address] J43.8     Past Medical History:   Diagnosis Date    Asthma     Hypertension       Past Surgical History:   Procedure Laterality Date    HX CATARACT REMOVAL  10/2018    HX DILATION AND CURETTAGE       Current Outpatient Medications   Medication Sig Dispense Refill    levothyroxine (SYNTHROID) 100 mcg tablet Take 1 Tab by mouth Daily (before breakfast). 90 Tab 3    amLODIPine (NORVASC) 10 mg tablet Take 1 Tab by mouth daily.  80 Tab 2    fluticasone propion-salmeterol (ADVAIR/WIXELA) 250-50 mcg/dose diskus inhaler Take 1 Puff by inhalation every twelve (12) hours. 1 Inhaler 3    VENTOLIN HFA 90 mcg/actuation inhaler INHALE ONE PUFF BY MOUTH EVERY 6 HOURS AS NEEDED FOR WHEEZING 1 Inhaler 3    albuterol (PROVENTIL VENTOLIN) 2.5 mg /3 mL (0.083 %) nebulizer solution use 1 vial via nebulizer once for 1 dose 75 Each 0    NASONEX 50 mcg/actuation nasal spray       Nebulizer & Compressor machine 1 Each by Does Not Apply route as needed. 1 Each 0    levocetirizine (XYZAL) 5 mg tablet Take  by mouth.  melatonin tab tablet Take  by mouth nightly.  CALCIUM CARBONATE (ANTACID CALCIUM PO) Take  by mouth.  cholecalciferol, vitamin D3, (VITAMIN D3) 2,000 unit Tab Take 1,000 Int'l Units by mouth.  aspirin 81 mg chewable tablet Take 325 mg by mouth daily.  PV W-O BONY/FERROUS FUMARATE/FA (M-VIT PO) Take  by mouth. Allergies   Allergen Reactions    Sulfa (Sulfonamide Antibiotics) Not Reported This Time       Family History   Problem Relation Age of Onset    Heart Disease Mother      Social History     Tobacco Use    Smoking status: Former Smoker     Last attempt to quit: 1999     Years since quittin.4    Smokeless tobacco: Never Used   Substance Use Topics    Alcohol use: No       Depression Risk Factor Screening:     3 most recent PHQ Screens 2019   Little interest or pleasure in doing things Not at all   Feeling down, depressed, irritable, or hopeless Not at all   Total Score PHQ 2 0       Alcohol Risk Factor Screening:   Do you average 1 drink per night or more than 7 drinks a week:  No    On any one occasion in the past three months have you have had more than 3 drinks containing alcohol:  No      Functional Ability and Level of Safety:   Hearing: Hearing is good. Activities of Daily Living: The home contains: no safety equipment. , using the supplementation osygen (3L) during the physical actiity  Patient does total self care    Ambulation: with no difficulty    Fall Risk:  Fall Risk Assessment, last 12 mths 5/26/2020   Able to walk? Yes   Fall in past 12 months? No       Abuse Screen:  Patient is not abused    Cognitive Screening   Has your family/caregiver stated any concerns about your memory: no      Patient Care Team   Patient Care Team:  Kade Anderson MD as PCP - General  Kade Anderson MD as PCP - [facility] Empaneled Provider   Dr. Pavel West MD - Pulmonologist     Assessment/Plan   Education and counseling provided:  Are appropriate based on today's review and evaluation    Diagnoses and all orders for this visit:    1. Medicare annual wellness visit, subsequent  -     [address]    2. Essential hypertension    3. Acquired hypothyroidism  -     TSH 3RD GENERATION; Future    4. Prediabetes  -     HEMOGLOBIN A1C WITH EAG    5. Screening for depression  -     DEPRESSION SCREEN ANNUAL    6. Need for hepatitis C screening test  -     HEPATITIS C AB    7. Other emphysema ([address]    Other orders  -     levothyroxine (SYNTHROID) 100 mcg tablet; Take 1 Tab by mouth Daily (before breakfast). SOME blood work was ordered by Dr. Maurilio Lake, will add TSH, Hep C Ab and HgA1C to the testing so the patient can get it when she goes to the LAB. Health Maintenance Due   Topic Date Due    Hepatitis C Screening  1945    Medicare Yearly Exam  05/21/2020       Doreen Montaño is a 76 y.o. female who was evaluated by an audio-video encounter for concerns as above. Patient identification was verified prior to start of the visit. A caregiver was present when appropriate. Due to this being a TeleHealth encounter ([facility] public health emergency), evaluation of the following organ systems was limited: Vitals/Constitutional/EENT/Resp/CV/GI//MS/Neuro/Skin/Heme-Lymph-Imm.   Pursuant to the emergency declaration under the [address] Emergencies Act, 1135 waiver authority and the Coronavirus Preparedness and Response Supplemental Appropriations Act, this Virtual Visit was conducted, with patient's (and/or legal guardian's) consent, to reduce the patient's risk of exposure to COVID-19 and provide necessary medical care. Services were provided through a synchronous discussion virtually to substitute for in-person clinic visit. I was at home. The patient was at home.       Pavel Ott MD

## 2020-06-16 RX ORDER — AMLODIPINE BESYLATE 10 MG/1
10 TABLET ORAL DAILY
Qty: 90 TAB | Refills: 2 | Status: SHIPPED | OUTPATIENT
Start: 2020-06-16 | End: 2021-03-12 | Stop reason: SDUPTHER

## 2020-09-29 ENCOUNTER — TRANSCRIBE ORDER (OUTPATIENT)
Dept: SCHEDULING | Age: 75
End: 2020-09-29

## 2020-09-29 DIAGNOSIS — J84.10 PULMONARY FIBROSIS (HCC): Primary | ICD-10-CM

## 2020-10-13 PROBLEM — J84.10 PULMONARY FIBROSIS (HCC): Status: ACTIVE | Noted: 2020-10-13

## 2021-01-15 ENCOUNTER — TRANSCRIBE ORDER (OUTPATIENT)
Dept: SCHEDULING | Age: 76
End: 2021-01-15

## 2021-01-15 DIAGNOSIS — J84.10 PULMONARY FIBROSIS (HCC): Primary | ICD-10-CM

## 2021-01-25 ENCOUNTER — HOSPITAL ENCOUNTER (OUTPATIENT)
Dept: CT IMAGING | Age: 76
Discharge: HOME | End: 2021-01-25
Attending: INTERNAL MEDICINE
Payer: MEDICARE

## 2021-01-25 DIAGNOSIS — J84.10 PULMONARY FIBROSIS (HCC): ICD-10-CM

## 2021-01-25 PROCEDURE — 71250 CT THORAX DX C-: CPT

## 2021-01-27 PROBLEM — Z92.89 H/O MAMMOGRAM: Status: ACTIVE | Noted: 2021-01-27

## 2021-02-11 ENCOUNTER — TRANSCRIBE ORDER (OUTPATIENT)
Dept: SCHEDULING | Age: 76
End: 2021-02-11

## 2021-02-11 DIAGNOSIS — J84.10 PULMONARY FIBROSIS (HCC): Primary | ICD-10-CM

## 2021-03-12 RX ORDER — AMLODIPINE BESYLATE 10 MG/1
10 TABLET ORAL DAILY
Qty: 90 TAB | Refills: 2 | Status: SHIPPED | OUTPATIENT
Start: 2021-03-12 | End: 2021-03-12 | Stop reason: SDUPTHER

## 2021-03-15 RX ORDER — AMLODIPINE BESYLATE 10 MG/1
10 TABLET ORAL DAILY
Qty: 90 TAB | Refills: 2 | Status: SHIPPED | OUTPATIENT
Start: 2021-03-15

## 2021-03-15 RX ORDER — LEVOTHYROXINE SODIUM 100 UG/1
100 TABLET ORAL
Qty: 90 TAB | Refills: 3 | Status: SHIPPED | OUTPATIENT
Start: 2021-03-15

## 2021-06-10 ENCOUNTER — TRANSCRIBE ORDER (OUTPATIENT)
Dept: SCHEDULING | Age: 76
End: 2021-06-10

## 2021-06-10 DIAGNOSIS — Z12.31 SCREENING MAMMOGRAM FOR HIGH-RISK PATIENT: Primary | ICD-10-CM

## 2021-08-06 ENCOUNTER — TRANSCRIBE ORDER (OUTPATIENT)
Dept: SCHEDULING | Age: 76
End: 2021-08-06

## 2021-08-06 DIAGNOSIS — J84.10 PULMONARY FIBROSIS (HCC): Primary | ICD-10-CM

## 2021-09-03 ENCOUNTER — HOSPITAL ENCOUNTER (OUTPATIENT)
Dept: CT IMAGING | Age: 76
Discharge: HOME | End: 2021-09-03
Attending: INTERNAL MEDICINE
Payer: MEDICARE

## 2021-09-03 DIAGNOSIS — J84.10 PULMONARY FIBROSIS (HCC): ICD-10-CM

## 2021-09-03 PROCEDURE — 71250 CT THORAX DX C-: CPT

## 2021-09-24 ENCOUNTER — TRANSCRIBE ORDER (OUTPATIENT)
Dept: SCHEDULING | Age: 76
End: 2021-09-24

## 2021-09-24 DIAGNOSIS — R13.10 DYSPHAGIA: Primary | ICD-10-CM

## 2021-11-22 ENCOUNTER — TRANSCRIBE ORDER (OUTPATIENT)
Dept: SCHEDULING | Age: 76
End: 2021-11-22

## 2021-11-22 DIAGNOSIS — J43.9 LUNG BULLAE (HCC): Primary | ICD-10-CM

## 2022-02-01 ENCOUNTER — TRANSCRIBE ORDER (OUTPATIENT)
Dept: SCHEDULING | Age: 77
End: 2022-02-01

## 2022-02-01 DIAGNOSIS — J43.9 BULLA, LUNG (HCC): Primary | ICD-10-CM

## 2022-03-19 PROBLEM — R05.3 CHRONIC COUGH: Status: ACTIVE | Noted: 2017-05-17

## 2022-03-19 PROBLEM — J43.8 OTHER EMPHYSEMA (HCC): Status: ACTIVE | Noted: 2019-12-05

## 2022-03-19 PROBLEM — Z92.89 H/O MAMMOGRAM: Status: ACTIVE | Noted: 2021-01-27

## 2022-03-19 PROBLEM — R63.4 UNINTENTIONAL WEIGHT LOSS: Status: ACTIVE | Noted: 2017-05-17

## 2022-03-19 PROBLEM — Z87.891 FORMER SMOKER: Status: ACTIVE | Noted: 2017-05-17

## 2022-03-19 PROBLEM — Z12.39 BREAST CANCER SCREENING: Status: ACTIVE | Noted: 2019-05-21

## 2022-03-19 PROBLEM — J84.10 PULMONARY FIBROSIS (HCC): Status: ACTIVE | Noted: 2020-10-13

## 2023-05-11 RX ORDER — ALBUTEROL SULFATE 2.5 MG/3ML
SOLUTION RESPIRATORY (INHALATION)
COMMUNITY
Start: 2018-02-12

## 2023-05-11 RX ORDER — LEVOCETIRIZINE DIHYDROCHLORIDE 5 MG/1
TABLET, FILM COATED ORAL
COMMUNITY

## 2023-05-11 RX ORDER — LEVOTHYROXINE SODIUM 0.1 MG/1
TABLET ORAL
COMMUNITY
Start: 2021-03-15

## 2023-05-11 RX ORDER — MOMETASONE FUROATE 50 UG/1
SPRAY, METERED NASAL
COMMUNITY
Start: 2017-05-01

## 2023-05-11 RX ORDER — AMLODIPINE BESYLATE 10 MG/1
TABLET ORAL DAILY
COMMUNITY
Start: 2021-03-15

## 2023-05-11 RX ORDER — ALBUTEROL SULFATE 90 UG/1
AEROSOL, METERED RESPIRATORY (INHALATION)
COMMUNITY
Start: 2019-01-30

## 2023-05-11 RX ORDER — CHOLECALCIFEROL (VITAMIN D3) 125 MCG
CAPSULE ORAL
COMMUNITY

## 2023-05-11 RX ORDER — ASPIRIN 81 MG/1
325 TABLET, CHEWABLE ORAL DAILY
COMMUNITY